# Patient Record
Sex: MALE | Race: WHITE | NOT HISPANIC OR LATINO | Employment: FULL TIME | URBAN - METROPOLITAN AREA
[De-identification: names, ages, dates, MRNs, and addresses within clinical notes are randomized per-mention and may not be internally consistent; named-entity substitution may affect disease eponyms.]

---

## 2021-06-02 ENCOUNTER — OFFICE VISIT CONVERTED (OUTPATIENT)
Dept: OTOLARYNGOLOGY | Facility: CLINIC | Age: 26
End: 2021-06-02
Attending: OTOLARYNGOLOGY

## 2021-06-02 ENCOUNTER — CONVERSION ENCOUNTER (OUTPATIENT)
Dept: OTOLARYNGOLOGY | Facility: CLINIC | Age: 26
End: 2021-06-02

## 2021-06-05 NOTE — H&P
"   History and Physical      Patient Name: Reji Argueta   Patient ID: [id number]   Sex: Male   YOB: 1995    Primary Care Provider: Rosemary YARBROUGH   Referring Provider: Rosmeary YARBROUGH    Visit Date: June 2, 2021    Provider: Hans Avalos MD   Location: [facility]   Location Address: [address] [place] [address], [address] [place] [address]  [id number]   Location Phone: [phone]          Chief Complaint     \"I am having trouble breathing through the left side of my nose.\"       History Of Present Illness  Reji Argueta is a 25 year old /White male with past medical history significant for nicotine use who presents to the office today as a consult from Rosemary YARBROUGH for evaluation of his nose. He that for approximately the last 5 years he has not been able to breathe through the left side of his nose. This is constant. He tells me he does experience right-sided rhinorrhea but none on the left. He has not had any issues with epistaxis, diminished sense of smell, or facial pain/pressure. He denies any prior nasal trauma or surgeries. He has tried mometasone and ipratropium bromide nasal spray without much improvement. His symptoms do not vary by the season. He does report a previous history of intranasal cocaine and methamphetamine use. He currently vapes. He has not undergone any imaging.       Past Medical History  Deviated nasal septum         Past Surgical History  Tonsillectomy         Medication List  Fish Oil 1,000 mg (120 mg-180 mg) oral capsule; Lexapro 20 mg oral tablet         Allergy List  NO KNOWN DRUG ALLERGIES         Family Medical History  Family history of heart disease; Family history of diabetes mellitus         Social History  Tobacco (Current some day); Vapes (Current every day)         Review of Systems  · Constitutional  o Denies  o : fever, night sweats, weight loss  · Eyes  o Denies  o : discharge from eye, impaired vision  · HENT  o Admits  o : " "*See HPI  · Cardiovascular  o Denies  o : chest pain, irregular heart beats  · Respiratory  o Denies  o : shortness of breath, wheezing, coughing up blood  · Gastrointestinal  o Denies  o : heartburn, reflux, vomiting blood  · Genitourinary  o Denies  o : frequency  · Integument  o Denies  o : rash, skin dryness  · Neurologic  o Denies  o : seizures, loss of balance, loss of consciousness, dizziness  · Endocrine  o Denies  o : cold intolerance, heat intolerance  · Heme-Lymph  o Denies  o : easy bleeding, anemia      Vitals  Date Time BP Position Site L\R Cuff Size HR RR TEMP (F) WT  HT  BMI kg/m2 BSA m2 O2 Sat FR L/min FiO2 HC       06/02/2021 10:28 AM        97.3 181lbs 6oz 6'  1.5\" 23.6 2.07             Physical Examination  · Constitutional  o Appearance  o : well developed, well-nourished, alert and in no acute distress, voice clear and strong  · Head and Face  o Head  o :   § Inspection  § : no deformities or lesions  o Face  o :   § Inspection  § : No facial lesions; House-Brackmann I/VI bilaterally  § Palpation  § : No TMJ crepitus nor  muscle tenderness bilaterally  · Eyes  o Vision  o :   § Visual Fields  § : Extraocular movements are intact. No spontaneous or gaze-induced nystagmus.  o Conjunctivae  o : clear  o Sclerae  o : clear  o Pupils and Irises  o : pupils equal, round, and reactive to light.   · Ears, Nose, Mouth and Throat  o Ears  o :   § External Ears  § : appearance within normal limits, no lesions present  § Otoscopic Examination  § : tympanic membrane appearance within normal limits bilaterally without perforations, well-aerated middle ears  § Hearing  § : intact to conversational voice both ears  o Nose  o :   § External Nose  § : appearance normal  § Intranasal Exam  § : mucosa within normal limits, vestibules normal, no intranasal lesions present, septum significantly deviated to the left causing approximately 99% obstruction, inferior turbinates are hypertrophied, sinuses non " tender to percussion  o Oral Cavity  o :   § Oral Mucosa  § : oral mucosa normal without pallor or cyanosis  § Lips  § : lip appearance normal  § Teeth  § : normal dentition for age  § Gums  § : gums pink, non-swollen, no bleeding present  § Tongue  § : tongue appearance normal; normal mobility  § Palate  § : hard palate normal, soft palate appearance normal with symmetric mobility  o Throat  o :   § Oropharynx  § : no inflammation or lesions present, tonsils surgically absent  § Hypopharynx  § : Deferred secondary to gag reflex  § Larynx  § : Deferred secondary to gag reflex  · Neck  o Inspection/Palpation  o : normal appearance, no masses or tenderness, trachea midline; thyroid size normal, nontender, no nodules or masses present on palpation  · Respiratory  o Respiratory Effort  o : breathing unlabored  o Inspection of Chest  o : normal appearance, no retractions  · Cardiovascular  o Heart  o : regular rate and rhythm  · Lymphatic  o Neck  o : no lymphadenopathy present  o Supraclavicular Nodes  o : no lymphadenopathy present  o Preauricular Nodes  o : no lymphadenopathy present  · Skin and Subcutaneous Tissue  o General Inspection  o : Regarding face and neck - there are no rashes present, no lesions present, and no areas of discoloration  · Neurologic  o Cranial Nerves  o : cranial nerves II-XII are grossly intact bilaterally  o Gait and Station  o : normal gait, able to stand without diffculty  · Psychiatric  o Judgement and Insight  o : judgment and insight intact  o Mood and Affect  o : mood normal, affect appropriate          Results     Diagnostic nasal endoscopy:    Indications: Left-sided nasal obstruction in a patient with inability to visualize the middle meatus on anterior rhinoscopy.    Summary: Patient's bilateral nares were decongested and anesthetized with Afrin and lidocaine sprays respectively. After giving the medications ample time to take effect a 0 rigid endoscope was inserted in the  bilateral nares revealing a left deviated nasal septum. The bilateral inferior turbinates were hypertrophied.  The bilateral middle turbinates were normal in appearance. The middle meati, olfactory clefts, and sphenoethmoidal recesses were without pus, polyps, or lesion bilaterally. The nasopharynx and bilateral eustachian tube orifices were without mass or lesion. The nasal mucosa was normal in appearance. The patient tolerated the procedure well.       Assessment  · Deviated nasal septum     470/J34.2  · Nasal obstruction     478.19/J34.89  · Hypertrophy of both inferior nasal turbinates     478.0/J34.3      Plan  · Orders  o Nasal endoscopy [facility] ([id number]) - 470/J34.2, 478.0/J34.3, 478.19/J34.89 - 06/02/2021  · Medications  o Medications have been Reconciled  o Transition of Care or Provider Policy  · Instructions  o Impressions and findings were discussed with Mr. Argueta at great length. Currently, he is seen for evaluation of left-sided nasal obstruction which is constant and has not improved significantly on mometasone and ipratropium bromide nasal sprays. Examination today reveals a significant left septal deviation and inferior turbinate hypertrophy. We discussed the pathophysiology and natural history of this condition. Options for management were discussed including continued medical management versus septoplasty with inferior turbinate reduction. The risks, benefits, and alternatives were discussed. The risks include septal perforation, epistaxis, infection, recurrence of congestion, nasal scarring, altered appearance, or CSF leak. After a thorough discussion he would like to pursue septoplasty with inferior turbinate reduction but would like to hold off for the time being secondary to his work schedule. He will call to arrange scheduling when he sees fit.  · Correspondence  o ENT Letter to Referring MD (Rosemary YARBROUGH) - 06/02/2021            Electronically Signed by: Hans Avalos MD -Author on  June 2, 2021 12:46:33 PM

## 2021-07-15 VITALS — TEMPERATURE: 97.3 F | HEIGHT: 73 IN | BODY MASS INDEX: 24.04 KG/M2 | WEIGHT: 181.37 LBS

## 2024-09-17 ENCOUNTER — TELEPHONE (OUTPATIENT)
Dept: GASTROENTEROLOGY | Facility: CLINIC | Age: 29
End: 2024-09-17
Payer: COMMERCIAL

## 2024-09-29 NOTE — PROGRESS NOTES
"Subjective   Reji Argueta is a 29 y.o. male who presents today for initial evaluation     Referring Provider:  Rosemary Holbrook PA-C  [address] [place] [address]    Chief Complaint: Anxiety    History of Present Illness:    09/30/2024: INITIAL VISIT Chart review:     Ottoniel: blank  Care Everywhere: a few non behavioral health notes    Psychotropic medication chart review:  Present:  Lexapro 20 mg a day  Trazodone 100 mg at night  Rexulti    Previously:      EKG: none  Procedures: none  Head imaging: none  Labs: none  Initial Chart Review Notes: Referred this month for depression.  Patient has a power of  in her father, Fercho.  Not much data.  Patient has missed or not responded to several calls made to set up follow-up.  These calls occurred from September 17 through September 20.      Patient Psychotherapy Notes:  Patient goals:  Misc:  \"Nervous laugh\"      Chart Review By Dates:  09/30/2024:   Planning:      VISITS/APPOINTMENTS (BELOW):    \"Reji\"      09/30/2024: In person.  Interview:  His/Her Story: \"Sometimes I have trouble taking care of myself.\"  Escitalopram is good to help depression.  Rexulti has been helping, it has been 3 weeks  Dreams are vivid though  Reluctant to go to sleep  Reminiscenses begin, like embarrassing memories  Rexulti helping with that.  P16, G10  Sleeping? improving  Eating? stable  Energy? Improving possibly  AIMS: no abnormal movements  Depression/Mood: improving  Depressed mood improving  poor energy improving  Insomnia improving.  Seasonal pattern: def  Severity: moderate, but improving  Duration: On and off for years  Anxiety:  Uncontrolled worrying, feeling on edge or restless, irritability,   insomnia.  Severity: mild  Duration: On and off for years  Panic attacks: stable  AIMS if on antipsychotic: no abnormal movements  Psych ROS: Positive for depression, anxiety.  Negative for psychosis   Diya is unclear.  ADHD: def  PTSD: def  No SI HI " ADINA.  Medication compliant: y    Access to Firearms: denies    PHQ-9 Depression Screening  PHQ-9 Total Score: 16    Little interest or pleasure in doing things? 3-->nearly every day   Feeling down, depressed, or hopeless? 1-->several days   Trouble falling or staying asleep, or sleeping too much? 2-->more than half the days   Feeling tired or having little energy? 3-->nearly every day   Poor appetite or overeating? 2-->more than half the days   Feeling bad about yourself - or that you are a failure or have let yourself or your family down? 2-->more than half the days   Trouble concentrating on things, such as reading the newspaper or watching television? 1-->several days   Moving or speaking so slowly that other people could have noticed? Or the opposite - being so fidgety or restless that you have been moving around a lot more than usual? 2-->more than half the days   Thoughts that you would be better off dead, or of hurting yourself in some way? 0-->not at all   PHQ-9 Total Score 16     EVE-7  Feeling nervous, anxious or on edge: Nearly every day  Not being able to stop or control worrying: Several days  Worrying too much about different things: Several days  Trouble Relaxing: Several days  Being so restless that it is hard to sit still: More than half the days  Feeling afraid as if something awful might happen: Several days  Becoming easily annoyed or irritable: Several days  EVE 7 Total Score: 10  If you checked any problems, how difficult have these problems made it for you to do your work, take care of things at home, or get along with other people: Very difficult    Past Surgical History:  Past Surgical History:   Procedure Laterality Date    TONSILLECTOMY         Problem List:  There is no problem list on file for this patient.      Allergy:   No Known Allergies     Discontinued Medications:  Medications Discontinued During This Encounter   Medication Reason    Brexpiprazole (Rexulti) 0.5 MG tablet      "Brexpiprazole (Rexulti) 2 MG tablet     Brexpiprazole (Rexulti) 1 MG tablet        Current Medications:   Current Outpatient Medications   Medication Sig Dispense Refill    Brexpiprazole (Rexulti) 1 MG tablet Take 1 tablet by mouth Daily. 30 tablet 5    Cholecalciferol (VITAMIN D-3 PO) Take  by mouth.      escitalopram (Lexapro) 20 MG tablet Lexapro 20 mg oral tablet take 1 tablet (20 mg) by oral route once daily   Active      ferrous sulfate 325 (65 Fe) MG tablet       hydrOXYzine (ATARAX) 25 MG tablet Every 6 (Six) Hours.      Omega-3 Fatty Acids (fish oil) 1000 MG capsule capsule Fish Oil 1,000 mg (120 mg-180 mg) oral capsule take 1 capsule by oral route daily   Active      traZODone (DESYREL) 100 MG tablet Take 1 tablet by mouth Every Night.       No current facility-administered medications for this visit.       Past Medical History:  Past Medical History:   Diagnosis Date    Anxiety     Depression        Past Psychiatric History:  Began Treatment: 12 - 13 years ago    Depression began at 11 yo  17 yo went to a Mand[facility] therapist for 3x    Seeing Rosemary since about 18 yo    Diagnoses: MDD, EVE  Psychiatrist:Denies  Therapist: yes in the past  Admission History:Denies  Medication Trials:    Celexa at 17 yo didn't work at all      Self Harm: Denies  Suicide Attempts:Denies      Substance Abuse History:   Types: 1 ppd, hx of cannabis, 20 - 25 yo was \"an alcoholic\", was on meth for 2 years, sober since 7/26/20  Withdrawal Symptoms:Denies  Longest Period Sober:Not Applicable   AA: none    Social History:  Martial Status:Single  Employed:No  Kids:No  House:Lives in a house   History: Denies    Social History     Socioeconomic History    Marital status: Single   Tobacco Use    Smoking status: Every Day     Current packs/day: 0.50     Average packs/day: 0.5 packs/day for 9.0 years (4.5 ttl pk-yrs)     Types: Cigarettes     Passive exposure: Current    Smokeless tobacco: Former   Vaping Use    Vaping status: " "Every Day    Substances: Nicotine   Substance and Sexual Activity    Alcohol use: Never    Drug use: Yes     Types: Marijuana     Comment: occ       Family History:   Suicide Attempts: Denies  Suicide Completions:Denies      History reviewed. No pertinent family history.    Developmental History:       Childhood: Denies Abuse, raised Sabianist  High School:Completed  College: some, one semester    Mental Status Exam  Appearance  : groomed, good eye contact, normal street clothes  Behavior  : pleasant and cooperative  Motor  : No abnormal  Speech  :normal rhythm, rate, volume, tone, not hyperverbal, not pressured, normal prosidy  Mood  : \"I'm doing pretty good now\"  Affect  : euthymic, mood congruent, good variability  Thought Content  : negative suicidal ideations, negative homicidal ideations, negative obsessions  Perceptions  : negative auditory hallucinations, negative visual hallucinations  Thought Process  : linear  Insight/Judgement  : Fair/fair  Cognition  : grossly intact  Attention   : intact      Review of Systems:  Review of Systems   Constitutional:  Positive for appetite change and fatigue.   Respiratory:  Positive for shortness of breath.    Psychiatric/Behavioral:  Positive for sleep disturbance.        Physical Exam:  Physical Exam    Vital Signs:   /70   Pulse 87   Ht 185.4 cm (73\")   Wt 98.4 kg (217 lb)   BMI 28.63 kg/m²      Lab Results:   No visits with results within 6 Month(s) from this visit.   Latest known visit with results is:   Admission on 08/20/2021, Discharged on 08/20/2021   Component Date Value Ref Range Status    SARS Antigen 08/20/2021 Not Detected  Not Detected Final    Internal Control 08/20/2021 Passed  Passed Final    Lot Number 08/20/2021 [id number]   Final    Expiration Date 08/20/2021 10,523   Final       EKG Results:  No orders to display       Imaging Results:  No Images in the past 120 days found..      Assessment & Plan   Diagnoses and all orders for this " visit:    1. Major depressive disorder, recurrent episode, moderate (Primary)  -     Brexpiprazole (Rexulti) 1 MG tablet; Take 1 tablet by mouth Daily.  Dispense: 30 tablet; Refill: 5    2. Generalized anxiety disorder    3. Insomnia due to mental condition  -     Brexpiprazole (Rexulti) 1 MG tablet; Take 1 tablet by mouth Daily.  Dispense: 30 tablet; Refill: 5    4. Panic attacks  -     Brexpiprazole (Rexulti) 1 MG tablet; Take 1 tablet by mouth Daily.  Dispense: 30 tablet; Refill: 5        Visit Diagnoses:    ICD-10-CM ICD-9-CM   1. Major depressive disorder, recurrent episode, moderate  F33.1 296.32   2. Generalized anxiety disorder  F41.1 300.02   3. Insomnia due to mental condition  F51.05 300.9     327.02   4. Panic attacks  F41.0 300.01     09/30/2024: R/O ADHD, bipolar. Improving MDD, EVE on rexulti. Has been on adderall before, but he avoids it due to substance use hx. 5w    PLAN:  Safety: No acute safety concerns  Therapy: None, maybe later  Risk Assessment: Risk of self-harm acutely is moderate.  Risk factors include anxiety disorder, mood disorder, AODA hx, and recent psychosocial stressors (pandemic). Protective factors include no family history, denies access to guns/weapons, no present SI, no history of suicide attempts or self-harm in the past, healthcare seeking, future orientation, willingness to engage in care.  Risk of self-harm chronically is also moderate, but could be further elevated in the event of treatment noncompliance and/or AODA.  Meds:  CONTINUE rexulti 1 mg qhs. Risks, benefits, alternatives discussed with patient including increased energy, exacerbation of irritability, weight gain, akathisia, GI upset, tardive dyskinesia/dystonia, movement issues, extrapyramidal symptoms, orthostatic hypotension.  Use care when operating vehicle, vessel, or machine. After discussion of these risks and benefits, the patient voiced understanding and agreed to proceed.  CONTINUE lexapro 20 mg qday.  Risks, benefits, alternatives discussed with patient including GI upset, nausea vomiting diarrhea, hyponatremia, theoretical decrease of seizure threshold predisposing the patient to a slightly higher seizure risk, headaches, sexual dysfunction, serotonin syndrome, bleeding risk, increased suicidality in patients 24 years and younger, switching to tenzin/hypomania.  After discussion of these risks and benefits, the patient voiced understanding and agreed to proceed.  CONTINUE hydroxyzine 25 mg qhs. Risks, benefits, alternatives discussed with patient including sedation, dizziness, fall risk, GI upset, and risk of increased CNS depression and elevated heart rate if taken with other antihistamines.  Use care when operating vehicle, vessel, or machine. After discussion of these risks and benefits, the patient voiced understanding and agreed to proceed.  Labs: none    Patient screened positive for depression based on a PHQ-9 score of 16 on 9/30/2024. Follow-up recommendations include: Prescribed antidepressant medication treatment and Suicide Risk Assessment performed.           TREATMENT PLAN/GOALS: Continue supportive psychotherapy efforts and medications as indicated. Treatment and medication options discussed during today's visit. Patient acknowledged and verbally consented to continue with current treatment plan and was educated on the importance of compliance with treatment and follow-up appointments.    MEDICATION ISSUES:  JASON reviewed as expected.  Discussed medication options and treatment plan of prescribed medication as well as the risks, benefits, and side effects including potential falls, possible impaired driving and metabolic adversities among others. Patient is agreeable to call the office with any worsening of symptoms or onset of side effects. Patient is agreeable to call 911 or go to the nearest ER should he/she begin having SI/HI. No medication side effects or related complaints today.     MEDS  ORDERED DURING VISIT:  New Medications Ordered This Visit   Medications    Brexpiprazole (Rexulti) 1 MG tablet     Sig: Take 1 tablet by mouth Daily.     Dispense:  30 tablet     Refill:  5       Return in about 5 weeks (around 11/4/2024).         This document has been electronically signed by Hiram Fitch MD  September 30, 2024 13:46 EDT    Dictated Utilizing Dragon Dictation: Part of this note may be an electronic transcription/translation of spoken language to printed text using the Dragon Dictation System.

## 2024-09-29 NOTE — PATIENT INSTRUCTIONS
1.  Please return to clinic at your next scheduled visit.  Contact the clinic ([phone]) at least 24 hours prior in the event you need to cancel.  2.  Do no harm to yourself or others.    3.  Avoid alcohol and drugs.    4.  Take all medications as prescribed.  Please contact the clinic with any concerns. If you are in need of medication refills, please call the clinic at [phone].    5. Should you want to get in touch with your provider, Dr. Hiram Fitch, please utilize [facility] or contact the office ([phone]), and staff will be able to page Dr. Fitch directly.  6.  In the event you have personal crisis, contact the following crisis numbers: Suicide Prevention Hotline 1-302.211.7713; [facility] Helpline 2-299-314-[facility]AE; [facility] Emergency Room [phone]; text HELLO to 637083; or 399.

## 2024-09-30 ENCOUNTER — OFFICE VISIT (OUTPATIENT)
Dept: PSYCHIATRY | Facility: CLINIC | Age: 29
End: 2024-09-30
Payer: COMMERCIAL

## 2024-09-30 VITALS
WEIGHT: 217 LBS | DIASTOLIC BLOOD PRESSURE: 70 MMHG | HEART RATE: 87 BPM | SYSTOLIC BLOOD PRESSURE: 136 MMHG | HEIGHT: 73 IN | BODY MASS INDEX: 28.76 KG/M2

## 2024-09-30 DIAGNOSIS — F41.1 GENERALIZED ANXIETY DISORDER: ICD-10-CM

## 2024-09-30 DIAGNOSIS — F51.05 INSOMNIA DUE TO MENTAL CONDITION: ICD-10-CM

## 2024-09-30 DIAGNOSIS — F33.1 MAJOR DEPRESSIVE DISORDER, RECURRENT EPISODE, MODERATE: Primary | ICD-10-CM

## 2024-09-30 DIAGNOSIS — F41.0 PANIC ATTACKS: ICD-10-CM

## 2024-09-30 PROCEDURE — 1160F RVW MEDS BY RX/DR IN RCRD: CPT | Performed by: STUDENT IN AN ORGANIZED HEALTH CARE EDUCATION/TRAINING PROGRAM

## 2024-09-30 PROCEDURE — 1159F MED LIST DOCD IN RCRD: CPT | Performed by: STUDENT IN AN ORGANIZED HEALTH CARE EDUCATION/TRAINING PROGRAM

## 2024-09-30 PROCEDURE — 90792 PSYCH DIAG EVAL W/MED SRVCS: CPT | Performed by: STUDENT IN AN ORGANIZED HEALTH CARE EDUCATION/TRAINING PROGRAM

## 2024-09-30 RX ORDER — BREXPIPRAZOLE 1 MG/1
1 TABLET ORAL EVERY 24 HOURS
Qty: 30 TABLET | Refills: 5 | Status: SHIPPED | OUTPATIENT
Start: 2024-09-30

## 2024-09-30 RX ORDER — BREXPIPRAZOLE 1 MG/1
TABLET ORAL EVERY 24 HOURS
COMMUNITY
Start: 2024-09-09 | End: 2024-09-30

## 2024-09-30 RX ORDER — HYDROXYZINE HYDROCHLORIDE 25 MG/1
TABLET, FILM COATED ORAL EVERY 6 HOURS SCHEDULED
COMMUNITY
Start: 2024-09-09

## 2024-09-30 RX ORDER — BREXPIPRAZOLE 2 MG/1
TABLET ORAL EVERY 24 HOURS
COMMUNITY
Start: 2024-09-09 | End: 2024-09-30

## 2024-09-30 RX ORDER — BREXPIPRAZOLE 0.5 MG/1
TABLET ORAL EVERY 24 HOURS
COMMUNITY
Start: 2024-09-09 | End: 2024-09-30

## 2024-09-30 RX ORDER — PNV NO.95/FERROUS FUM/FOLIC AC 28MG-0.8MG
TABLET ORAL
COMMUNITY
Start: 2024-09-18

## 2024-09-30 NOTE — TREATMENT PLAN
Multi-Disciplinary Problems (from Behavioral Health Treatment Plan)      Active Problems       Problem: Anxiety  Start Date: 09/30/24      Problem Details: The patient self-scales this problem as a 5 with 10 being the worst.  relationships, family conflict        Goal Priority Start Date Expected End Date End Date    Patient will develop and implement behavioral and cognitive strategies to reduce anxiety and irrational fears. -- 09/30/24 03/31/25 --    Goal Details: Progress toward goal:  Not appropriate to rate progress toward goal since this is the initial treatment plan.          Goal Intervention Frequency Start Date End Date    Help patient explore past emotional issues in relation to present anxiety. Q Month 09/30/24 --    Intervention Details: Duration of treatment until remission of symptoms.          Goal Intervention Frequency Start Date End Date    Help patient develop an awareness of their cognitive and physical responses to anxiety. Q Month 09/30/24 --    Intervention Details: Duration of treatment until remission of symptoms.                  Problem: Depression  Start Date: 09/30/24      Problem Details: The patient self-scales this problem as a 6 with 10 being the worst.  relationships, family conflict        Goal Priority Start Date Expected End Date End Date    Patient will demonstrate the ability to initiate new constructive life skills outside of sessions on a consistent basis. -- 09/30/24 03/31/25 --    Goal Details: Progress toward goal:  Not appropriate to rate progress toward goal since this is the initial treatment plan.          Goal Intervention Frequency Start Date End Date    Assist patient in setting attainable activities of daily living goals. PRN 09/30/24 --      Goal Intervention Frequency Start Date End Date    Provide education about depression Q Month 09/30/24 --    Intervention Details: Duration of treatment until remission of symptoms.          Goal Intervention Frequency Start  Date End Date    Assist patient in developing healthy coping strategies. Q Month 09/30/24 --    Intervention Details: Duration of treatment until remission of symptoms.                          Reviewed By       Hiram Fitch MD 09/30/24 [id number]                     I have discussed and reviewed this treatment plan with the patient.

## 2024-09-30 NOTE — PLAN OF CARE
[facility] psychotherapy to help manage depression and anxiety related to relationships, family conflict

## 2024-10-28 ENCOUNTER — OFFICE (OUTPATIENT)
Dept: URBAN - METROPOLITAN AREA CLINIC 76 | Facility: CLINIC | Age: 29
End: 2024-10-28
Payer: COMMERCIAL

## 2024-10-28 ENCOUNTER — OFFICE (OUTPATIENT)
Age: 29
End: 2024-10-28
Payer: COMMERCIAL

## 2024-10-28 VITALS
DIASTOLIC BLOOD PRESSURE: 68 MMHG | SYSTOLIC BLOOD PRESSURE: 110 MMHG | HEART RATE: 72 BPM | WEIGHT: 222 LBS | HEIGHT: 73 IN | DIASTOLIC BLOOD PRESSURE: 68 MMHG | OXYGEN SATURATION: 94 % | HEART RATE: 72 BPM | OXYGEN SATURATION: 94 % | WEIGHT: 222 LBS | HEIGHT: 73 IN | SYSTOLIC BLOOD PRESSURE: 110 MMHG

## 2024-10-28 DIAGNOSIS — R19.7 DIARRHEA, UNSPECIFIED: ICD-10-CM

## 2024-10-28 DIAGNOSIS — R13.10 DYSPHAGIA, UNSPECIFIED: ICD-10-CM

## 2024-10-28 DIAGNOSIS — R19.5 OTHER FECAL ABNORMALITIES: ICD-10-CM

## 2024-10-28 DIAGNOSIS — K21.9 GASTRO-ESOPHAGEAL REFLUX DISEASE WITHOUT ESOPHAGITIS: ICD-10-CM

## 2024-10-28 DIAGNOSIS — D50.9 IRON DEFICIENCY ANEMIA, UNSPECIFIED: ICD-10-CM

## 2024-10-28 DIAGNOSIS — Z83.719 FAMILY HISTORY OF COLON POLYPS, UNSPECIFIED: ICD-10-CM

## 2024-10-28 PROCEDURE — 99204 OFFICE O/P NEW MOD 45 MIN: CPT | Performed by: NURSE PRACTITIONER

## 2024-10-28 RX ORDER — OMEPRAZOLE 40 MG/1
CAPSULE, DELAYED RELEASE ORAL
Qty: 30 | Refills: 4 | Status: ACTIVE
Start: 2024-10-28

## 2024-10-28 RX ORDER — DICYCLOMINE HYDROCHLORIDE 10 MG/1
CAPSULE ORAL
Qty: 30 | Refills: 3 | Status: ACTIVE
Start: 2024-10-28

## 2024-11-08 ENCOUNTER — OFFICE VISIT (OUTPATIENT)
Dept: PSYCHIATRY | Facility: CLINIC | Age: 29
End: 2024-11-08
Payer: COMMERCIAL

## 2024-11-08 VITALS
HEIGHT: 73 IN | HEART RATE: 110 BPM | DIASTOLIC BLOOD PRESSURE: 66 MMHG | WEIGHT: 224.2 LBS | SYSTOLIC BLOOD PRESSURE: 130 MMHG | BODY MASS INDEX: 29.72 KG/M2

## 2024-11-08 DIAGNOSIS — F41.1 GENERALIZED ANXIETY DISORDER: ICD-10-CM

## 2024-11-08 DIAGNOSIS — F33.1 MAJOR DEPRESSIVE DISORDER, RECURRENT EPISODE, MODERATE: Primary | ICD-10-CM

## 2024-11-08 DIAGNOSIS — F41.0 PANIC ATTACKS: ICD-10-CM

## 2024-11-08 DIAGNOSIS — F51.05 INSOMNIA DUE TO MENTAL CONDITION: ICD-10-CM

## 2024-11-08 RX ORDER — BREXPIPRAZOLE 1 MG/1
1.5 TABLET ORAL EVERY 24 HOURS
Qty: 45 TABLET | Refills: 5 | Status: SHIPPED | OUTPATIENT
Start: 2024-11-08

## 2024-11-08 RX ORDER — DICYCLOMINE HYDROCHLORIDE 10 MG/1
CAPSULE ORAL
COMMUNITY
Start: 2024-10-28

## 2024-11-08 RX ORDER — OMEPRAZOLE 40 MG/1
CAPSULE, DELAYED RELEASE ORAL
COMMUNITY
Start: 2024-10-28

## 2024-11-08 RX ORDER — ROPINIROLE 0.5 MG/1
TABLET, FILM COATED ORAL EVERY 8 HOURS SCHEDULED
COMMUNITY

## 2024-11-08 NOTE — PROGRESS NOTES
"Subjective   Reji Argueta is a 29 y.o. male who presents today for initial evaluation     Referring Provider:  No referring provider defined for this encounter.    Chief Complaint: Anxiety    History of Present Illness:    2024: INITIAL VISIT Chart review:     Ottoniel: blank  Care Everywhere: a few non behavioral health notes    Psychotropic medication chart review:  Present:  Lexapro 20 mg a day  Trazodone 100 mg at night  Rexulti    Previously:  2024: R/O ADHD, bipolar. Improving MDD, EVE on rexulti. Has been on adderall before, but he avoids it due to substance use hx. 5w    EKG: none  Procedures: none  Head imaging: none  Labs: none  Initial Chart Review Notes: Referred this month for depression.  Patient has a power of  in her father, Fercho.  Not much data.  Patient has missed or not responded to several calls made to set up follow-up.  These calls occurred from  through .      Chart Review By Dates:  24: no visits.    Plannin2024: R/O ADHD, bipolar. Improving MDD, EVE on rexulti. Has been on adderall before, but he avoids it due to substance use hx. 5w    VISITS/APPOINTMENTS (BELOW):    \"Reji\"  \"Nervous laugh\"  Has been on adderall before, but avoids 2/2 substance use hx  R/o bipolar    2024:   In person interview:  \"Good.\"  Plateaued. People around me say I don't have mood swings.  I still have bouts of depression  Plans on going back to work soon  Anxiety and depression both improved  MDD: mild depressed mood  EVE: agoraphobia  Panic attacks: yes  Energy: improving  Concentration: chronically low  Insomnia: stable  AIMS if on antipsychotic:   Eating/Weight: 224 lbs  Refills: y  Substances: 1 ppd, drinks 1x/mo, no cannabis but using OTC delta9 vape  Therapy: n  Medication compliant: y  SE: n  No SI HI AVH.      2024:   In person Interview:  His/Her Story: \"Sometimes I have trouble taking care of myself.\"  Escitalopram is good to " help depression.  Rexulti has been helping, it has been 3 weeks  Dreams are vivid though  Reluctant to go to sleep  Reminiscenses begin, like embarrassing memories  Rexulti helping with that.  P16, G10  Sleeping? improving  Eating? stable  Energy? Improving possibly  AIMS: no abnormal movements  Depression/Mood: improving  Depressed mood improving  poor energy improving  Insomnia improving.  Seasonal pattern: def  Severity: moderate, but improving  Duration: On and off for years  Anxiety:  Uncontrolled worrying, feeling on edge or restless, irritability,   insomnia.  Severity: mild  Duration: On and off for years  Panic attacks: stable  AIMS if on antipsychotic: no abnormal movements  Psych ROS: Positive for depression, anxiety.  Negative for psychosis   Diya is unclear.  ADHD: def  PTSD: def  No SI HI AVH.  Medication compliant: y    Access to Firearms: denies    PHQ-9 Depression Screening  PHQ-9 Total Score:      Little interest or pleasure in doing things?     Feeling down, depressed, or hopeless?     Trouble falling or staying asleep, or sleeping too much?     Feeling tired or having little energy?     Poor appetite or overeating?     Feeling bad about yourself - or that you are a failure or have let yourself or your family down?     Trouble concentrating on things, such as reading the newspaper or watching television?     Moving or speaking so slowly that other people could have noticed? Or the opposite - being so fidgety or restless that you have been moving around a lot more than usual?     Thoughts that you would be better off dead, or of hurting yourself in some way?     PHQ-9 Total Score       EVE-7       Past Surgical History:  Past Surgical History:   Procedure Laterality Date    TONSILLECTOMY         Problem List:  There is no problem list on file for this patient.      Allergy:   No Known Allergies     Discontinued Medications:  Medications Discontinued During This Encounter   Medication Reason     "Brexpiprazole (Rexulti) 1 MG tablet Reorder         Current Medications:   Current Outpatient Medications   Medication Sig Dispense Refill    Brexpiprazole (Rexulti) 1 MG tablet Take 1.5 tablets by mouth Daily. 45 tablet 5    Cholecalciferol (VITAMIN D-3 PO) Take  by mouth.      dicyclomine (BENTYL) 10 MG capsule       escitalopram (Lexapro) 20 MG tablet Lexapro 20 mg oral tablet take 1 tablet (20 mg) by oral route once daily   Active      ferrous sulfate 325 (65 Fe) MG tablet       hydrOXYzine (ATARAX) 25 MG tablet Every 6 (Six) Hours.      Omega-3 Fatty Acids (fish oil) 1000 MG capsule capsule Fish Oil 1,000 mg (120 mg-180 mg) oral capsule take 1 capsule by oral route daily   Active      omeprazole (priLOSEC) 40 MG capsule       rOPINIRole (REQUIP) 0.5 MG tablet Every 8 (Eight) Hours.      traZODone (DESYREL) 100 MG tablet Take 1 tablet by mouth Every Night.       No current facility-administered medications for this visit.       Past Medical History:  Past Medical History:   Diagnosis Date    Anxiety     Depression        Past Psychiatric History:  Began Treatment: 12 - 13 years ago    Depression began at 11 yo  15 yo went to a [employer] therapist for 3x    Seeing [facility] since about 18 yo    Diagnoses: MDD, EVE  Psychiatrist:Denies  Therapist: yes in the past  Admission History:Denies  Medication Trials:    Celexa at 15 yo didn't work at all      Self Harm: Denies  Suicide Attempts:Denies      Substance Abuse History:   Types: 1 ppd, hx of cannabis, 20 - 23 yo was \"an alcoholic\", was on meth for 2 years, sober since 7/26/20  Withdrawal Symptoms:Denies  Longest Period Sober:Not Applicable   AA: none    Social History:  Martial Status:Single  Employed:No  Kids:No  House:Lives in a house   History: Denies    Social History     Socioeconomic History    Marital status: Single   Tobacco Use    Smoking status: Every Day     Current packs/day: 0.50     Average packs/day: 0.5 packs/day for 9.0 years (4.5 ttl pk-yrs) " "    Types: Cigarettes     Passive exposure: Current    Smokeless tobacco: Former   Vaping Use    Vaping status: Every Day    Substances: Nicotine   Substance and Sexual Activity    Alcohol use: Never    Drug use: Yes     Types: Marijuana     Comment: occ       Family History:   Suicide Attempts: Denies  Suicide Completions:Denies      History reviewed. No pertinent family history.    Developmental History:       Childhood: Denies Abuse, raised [employer]  High School:Completed  College: some, one semester    Mental Status Exam  Appearance  : groomed, good eye contact, normal street clothes  Behavior  : pleasant and cooperative  Motor  : No abnormal  Speech  :normal rhythm, rate, volume, tone, not hyperverbal, not pressured, normal prosidy  Mood  : \"Plateued\"  Affect  : euthymic, mood congruent, good variability  Thought Content  : negative suicidal ideations, negative homicidal ideations, negative obsessions  Perceptions  : negative auditory hallucinations, negative visual hallucinations  Thought Process  : linear  Insight/Judgement  : Fair/fair  Cognition  : grossly intact  Attention   : intact      Review of Systems:  Review of Systems   Constitutional:  Negative for appetite change, diaphoresis and fatigue.   HENT:  Negative for drooling.    Eyes:  Negative for visual disturbance.   Respiratory:  Negative for cough, chest tightness and shortness of breath.    Cardiovascular:  Negative for chest pain, palpitations and leg swelling.   Gastrointestinal:  Negative for abdominal pain, diarrhea, nausea and vomiting.   Endocrine: Negative for cold intolerance and heat intolerance.   Genitourinary:  Negative for difficulty urinating.   Musculoskeletal:  Negative for joint swelling.   Allergic/Immunologic: Negative for immunocompromised state.   Neurological:  Negative for dizziness, seizures, speech difficulty, light-headedness, numbness and headaches.   Psychiatric/Behavioral:  Negative for behavioral problems and sleep " "disturbance.        Physical Exam:  Physical Exam    Vital Signs:   /66   Pulse 110   Ht 185.4 cm (73\")   Wt 102 kg (224 lb 3.2 oz)   BMI 29.58 kg/m²      Lab Results:   No visits with results within 6 Month(s) from this visit.   Latest known visit with results is:   Admission on 08/20/2021, Discharged on 08/20/2021   Component Date Value Ref Range Status    SARS Antigen 08/20/2021 Not Detected  Not Detected Final    Internal Control 08/20/2021 Passed  Passed Final    Lot Number 08/20/2021 [id number]   Final    Expiration Date 08/20/2021 10,523   Final       EKG Results:  No orders to display       Imaging Results:  No Images in the past 120 days found..      Assessment & Plan   Diagnoses and all orders for this visit:    1. Major depressive disorder, recurrent episode, moderate (Primary)  -     Brexpiprazole (Rexulti) 1 MG tablet; Take 1.5 tablets by mouth Daily.  Dispense: 45 tablet; Refill: 5    2. Generalized anxiety disorder    3. Insomnia due to mental condition  -     Brexpiprazole (Rexulti) 1 MG tablet; Take 1.5 tablets by mouth Daily.  Dispense: 45 tablet; Refill: 5    4. Panic attacks  -     Brexpiprazole (Rexulti) 1 MG tablet; Take 1.5 tablets by mouth Daily.  Dispense: 45 tablet; Refill: 5        Visit Diagnoses:    ICD-10-CM ICD-9-CM   1. Major depressive disorder, recurrent episode, moderate  F33.1 296.32   2. Generalized anxiety disorder  F41.1 300.02   3. Insomnia due to mental condition  F51.05 300.9     327.02   4. Panic attacks  F41.0 300.01     11/08/2024: Improving, increase rexulti for MDD, EVE.    Allowed patient to freely discuss and process issues, such as:  Anxiety at re-starting work.  Anxiety and depression regarding relationships.  ... using Rogerian psychotherapeutic techniques including unconditional positive regard, reflective listening, and demonstrating clear empathy, with the goal of ameliorating symptoms and maintaining, restoring, or improving self-esteem, adaptive skills, " and ego or psychological functions (Fox et al. 1991), the long-term goal of which is to develop a better, healthier perspective and help the patient bear their circumstances more easily.  Time (minutes) spent providing supportive psychotherapy: 16  (This time is exclusive to the therapy session and separate from the time spent on activities used to meet the criteria for the E/M service (history, exam, medical decision-making).)  Start: 1:03  Stop: 1:19  Functional status: mild impairment  Treatment plan: Medication management and supportive psychotherapy  Prognosis: good  Progress: improving  6w    09/30/2024: R/O ADHD, bipolar. Improving MDD, EVE on rexulti. Has been on adderall before, but he avoids it due to substance use hx. 5w    PLAN:  Safety: No acute safety concerns  Therapy: None, maybe later  Risk Assessment: Risk of self-harm acutely is moderate.  Risk factors include anxiety disorder, mood disorder, AODA hx, and recent psychosocial stressors (pandemic). Protective factors include no family history, denies access to guns/weapons, no present SI, no history of suicide attempts or self-harm in the past, healthcare seeking, future orientation, willingness to engage in care.  Risk of self-harm chronically is also moderate, but could be further elevated in the event of treatment noncompliance and/or AODA.  Meds:  INCREASE rexulti 1 to 1.5 mg qhs. Risks, benefits, alternatives discussed with patient including increased energy, exacerbation of irritability, weight gain, akathisia, GI upset, tardive dyskinesia/dystonia, movement issues, extrapyramidal symptoms, orthostatic hypotension.  Use care when operating vehicle, vessel, or machine. After discussion of these risks and benefits, the patient voiced understanding and agreed to proceed.  CONTINUE lexapro 20 mg qday. Risks, benefits, alternatives discussed with patient including GI upset, nausea vomiting diarrhea, hyponatremia, theoretical decrease of seizure  threshold predisposing the patient to a slightly higher seizure risk, headaches, sexual dysfunction, serotonin syndrome, bleeding risk, increased suicidality in patients 24 years and younger, switching to tenzin/hypomania.  After discussion of these risks and benefits, the patient voiced understanding and agreed to proceed.  CONTINUE hydroxyzine 25 mg qhs. Risks, benefits, alternatives discussed with patient including sedation, dizziness, fall risk, GI upset, and risk of increased CNS depression and elevated heart rate if taken with other antihistamines.  Use care when operating vehicle, vessel, or machine. After discussion of these risks and benefits, the patient voiced understanding and agreed to proceed.  Labs: none    Patient screened positive for depression based on a PHQ-9 score of  on . Follow-up recommendations include: Prescribed antidepressant medication treatment and Suicide Risk Assessment performed.           TREATMENT PLAN/GOALS: Continue supportive psychotherapy efforts and medications as indicated. Treatment and medication options discussed during today's visit. Patient acknowledged and verbally consented to continue with current treatment plan and was educated on the importance of compliance with treatment and follow-up appointments.    MEDICATION ISSUES:  JASON reviewed as expected.  Discussed medication options and treatment plan of prescribed medication as well as the risks, benefits, and side effects including potential falls, possible impaired driving and metabolic adversities among others. Patient is agreeable to call the office with any worsening of symptoms or onset of side effects. Patient is agreeable to call 911 or go to the nearest ER should he/she begin having SI/HI. No medication side effects or related complaints today.     MEDS ORDERED DURING VISIT:  New Medications Ordered This Visit   Medications    Brexpiprazole (Rexulti) 1 MG tablet     Sig: Take 1.5 tablets by mouth Daily.      Dispense:  45 tablet     Refill:  5     Replaces 1 mg dose. Thank you for the help. Please call with questions: [phone].       Return in about 6 weeks (around 12/20/2024).         This document has been electronically signed by Hiram Fitch MD  November 8, 2024 13:20 EST    Dictated Utilizing Dragon Dictation: Part of this note may be an electronic transcription/translation of spoken language to printed text using the Dragon Dictation System.

## 2024-11-08 NOTE — PATIENT INSTRUCTIONS
1.  Please return to clinic at your next scheduled visit.  Contact the clinic ([phone]) at least 24 hours prior in the event you need to cancel.  2.  Do no harm to yourself or others.    3.  Avoid alcohol and drugs.    4.  Take all medications as prescribed.  Please contact the clinic with any concerns. If you are in need of medication refills, please call the clinic at [phone].    5. Should you want to get in touch with your provider, Dr. Hiram Fitch, please utilize [employer] or contact the office ([phone]), and staff will be able to page Dr. Fitch directly.  6.  In the event you have personal crisis, contact the following crisis numbers: Suicide Prevention Hotline 1-142.472.1176; [facility] Helpline 4-421-357-[facility]; [facility] Emergency Room [phone]; text HELLO to 959455; or 182.

## 2024-12-09 ENCOUNTER — TELEPHONE (OUTPATIENT)
Dept: PSYCHIATRY | Facility: CLINIC | Age: 29
End: 2024-12-09
Payer: COMMERCIAL

## 2024-12-30 ENCOUNTER — OFFICE VISIT (OUTPATIENT)
Dept: PSYCHIATRY | Facility: CLINIC | Age: 29
End: 2024-12-30
Payer: COMMERCIAL

## 2024-12-30 VITALS
HEART RATE: 71 BPM | BODY MASS INDEX: 30.35 KG/M2 | HEIGHT: 73 IN | OXYGEN SATURATION: 97 % | WEIGHT: 229 LBS | SYSTOLIC BLOOD PRESSURE: 115 MMHG | DIASTOLIC BLOOD PRESSURE: 64 MMHG

## 2024-12-30 DIAGNOSIS — F41.1 GENERALIZED ANXIETY DISORDER: ICD-10-CM

## 2024-12-30 DIAGNOSIS — F51.05 INSOMNIA DUE TO MENTAL CONDITION: ICD-10-CM

## 2024-12-30 DIAGNOSIS — F33.1 MAJOR DEPRESSIVE DISORDER, RECURRENT EPISODE, MODERATE: Primary | ICD-10-CM

## 2024-12-30 DIAGNOSIS — F41.0 PANIC ATTACKS: ICD-10-CM

## 2024-12-30 RX ORDER — ATORVASTATIN CALCIUM 10 MG/1
1 TABLET, FILM COATED ORAL DAILY
COMMUNITY
Start: 2024-12-23 | End: 2025-01-22

## 2024-12-30 RX ORDER — BREXPIPRAZOLE 1 MG/1
1.5 TABLET ORAL EVERY 24 HOURS
Qty: 45 TABLET | Refills: 5 | Status: SHIPPED | OUTPATIENT
Start: 2024-12-30

## 2024-12-30 RX ORDER — ESCITALOPRAM OXALATE 20 MG/1
20 TABLET ORAL DAILY
Qty: 90 TABLET | Refills: 3 | Status: SHIPPED | OUTPATIENT
Start: 2024-12-30

## 2024-12-30 NOTE — TREATMENT PLAN
Multi-Disciplinary Problems (from Behavioral Health Treatment Plan)      Active Problems       Problem: Anxiety  Start Date: 12/30/24      Problem Details: The patient self-scales this problem as a 4 with 10 being the worst.  relationships, family conflict, work stresses        Goal Priority Start Date Expected End Date End Date    Patient will develop and implement behavioral and cognitive strategies to reduce anxiety and irrational fears. -- 12/30/24 06/30/25 --    Goal Details: Progress toward goal:  improving          Goal Intervention Frequency Start Date End Date    Help patient explore past emotional issues in relation to present anxiety. Q Month 12/30/24 --    Intervention Details: Duration of treatment until remission of symptoms.          Goal Intervention Frequency Start Date End Date    Help patient develop an awareness of their cognitive and physical responses to anxiety. Q Month 12/30/24 --    Intervention Details: Duration of treatment until remission of symptoms.                  Problem: Depression  Start Date: 12/30/24      Problem Details: The patient self-scales this problem as a 4 with 10 being the worst.  relationships, family conflict, work stresses        Goal Priority Start Date Expected End Date End Date    Patient will demonstrate the ability to initiate new constructive life skills outside of sessions on a consistent basis. -- 12/30/24 06/30/25 --    Goal Details: Progress toward goal:  improving          Goal Intervention Frequency Start Date End Date    Assist patient in setting attainable activities of daily living goals. PRN 12/30/24 --      Goal Intervention Frequency Start Date End Date    Provide education about depression Q Month 12/30/24 --    Intervention Details: Duration of treatment until remission of symptoms.          Goal Intervention Frequency Start Date End Date    Assist patient in developing healthy coping strategies. Q Month 12/30/24 --    Intervention Details:  Duration of treatment until remission of symptoms.                          Reviewed By       Hiram Fitch MD 12/30/24 1859                     I have discussed and reviewed this treatment plan with the patient.

## 2024-12-30 NOTE — PROGRESS NOTES
"Subjective   Reji Argueta is a 29 y.o. male who presents today for initial evaluation     Referring Provider:  No referring provider defined for this encounter.    Chief Complaint: Anxiety    History of Present Illness:    2024: INITIAL VISIT Chart review:     Ottoniel: blank  Care Everywhere: a few non behavioral health notes    Psychotropic medication chart review:  Present:  Lexapro 20 mg a day  Trazodone 100 mg at night  Rexulti    Previously:  2024: R/O ADHD, bipolar. Improving MDD, EVE on rexulti. Has been on adderall before, but he avoids it due to substance use hx. 5w    EKG: none  Procedures: none  Head imaging: none  Labs: none  Initial Chart Review Notes: Referred this month for depression.  Patient has a power of  in her father, Fercho.  Not much data.  Patient has missed or not responded to several calls made to set up follow-up.  These calls occurred from  through .      Chart Review By Dates:  2024: no visits.  24: no visits.    Plannin2024: Improving, increase rexulti for MDD, EVE.  2024: R/O ADHD, bipolar. Improving MDD, EVE on rexulti. Has been on adderall before, but he avoids it due to substance use hx. 5w    VISITS/APPOINTMENTS (BELOW):    \"Reji\"  \"Nervous laugh\"  Has been on adderall before, but avoids 2/2 substance use hx  R/o bipolar    2024:   In person interview:  \"I've been ok, but I haven't been taking the medication.\"  Dad just had brain surgery, now in rehab. Had brain cancer.  I'm back to where I was.  Ruminating  Mom noticed I'm not on it. More negative, maybe more irritable  Bouts of depression are worse  Not really using any substances  MDD: mild depressed mood  EVE: agoraphobia, irritable  Panic attacks: yes  Energy: improving  Concentration: chronically low  Insomnia: stable  AIMS if on antipsychotic: no abnl movements  Eating/Weight: 229, 224 lbs  Refills: y  Substances: 1 ppd, drinks 1x/mo, no " "cannabis but using OTC delta9 vape  Therapy: n  Medication compliant: y  SE: n  No SI HI AVH.      11/08/2024:   In person interview:  \"Good.\"  Plateaued. People around me say I don't have mood swings.  I still have bouts of depression  Plans on going back to work soon  Anxiety and depression both improved  MDD: mild depressed mood  EVE: agoraphobia  Panic attacks: yes  Energy: improving  Concentration: chronically low  Insomnia: stable  AIMS if on antipsychotic:   Eating/Weight: 224 lbs  Refills: y  Substances: 1 ppd, drinks 1x/mo, no cannabis but using OTC delta9 vape  Therapy: n  Medication compliant: y  SE: n  No SI HI AVH.      09/30/2024:   In person Interview:  His/Her Story: \"Sometimes I have trouble taking care of myself.\"  Escitalopram is good to help depression.  Rexulti has been helping, it has been 3 weeks  Dreams are vivid though  Reluctant to go to sleep  Reminiscenses begin, like embarrassing memories  Rexulti helping with that.  P16, G10  Sleeping? improving  Eating? stable  Energy? Improving possibly  AIMS: no abnormal movements  Depression/Mood: improving  Depressed mood improving  poor energy improving  Insomnia improving.  Seasonal pattern: def  Severity: moderate, but improving  Duration: On and off for years  Anxiety:  Uncontrolled worrying, feeling on edge or restless, irritability,   insomnia.  Severity: mild  Duration: On and off for years  Panic attacks: stable  AIMS if on antipsychotic: no abnormal movements  Psych ROS: Positive for depression, anxiety.  Negative for psychosis   Diya is unclear.  ADHD: def  PTSD: def  No SI HI AVH.  Medication compliant: y    Access to Firearms: denies    PHQ-9 Depression Screening  PHQ-9 Total Score:      Little interest or pleasure in doing things?     Feeling down, depressed, or hopeless?     Trouble falling or staying asleep, or sleeping too much?     Feeling tired or having little energy?     Poor appetite or overeating?     Feeling bad about " yourself - or that you are a failure or have let yourself or your family down?     Trouble concentrating on things, such as reading the newspaper or watching television?     Moving or speaking so slowly that other people could have noticed? Or the opposite - being so fidgety or restless that you have been moving around a lot more than usual?     Thoughts that you would be better off dead, or of hurting yourself in some way?     PHQ-9 Total Score       EVE-7       Past Surgical History:  Past Surgical History:   Procedure Laterality Date    TONSILLECTOMY         Problem List:  There is no problem list on file for this patient.      Allergy:   No Known Allergies     Discontinued Medications:  Medications Discontinued During This Encounter   Medication Reason    Brexpiprazole (Rexulti) 1 MG tablet Reorder    traZODone (DESYREL) 100 MG tablet *Therapy completed    hydrOXYzine (ATARAX) 25 MG tablet *Therapy completed    escitalopram (Lexapro) 20 MG tablet Reorder           Current Medications:   Current Outpatient Medications   Medication Sig Dispense Refill    atorvastatin (LIPITOR) 10 MG tablet Take 1 tablet by mouth Daily.      Brexpiprazole (Rexulti) 1 MG tablet Take 1.5 tablets by mouth Daily. 45 tablet 5    Cholecalciferol (VITAMIN D-3 PO) Take  by mouth.      escitalopram (Lexapro) 20 MG tablet Take 1 tablet by mouth Daily. 90 tablet 3    ferrous sulfate 325 (65 Fe) MG tablet       Omega-3 Fatty Acids (fish oil) 1000 MG capsule capsule Fish Oil 1,000 mg (120 mg-180 mg) oral capsule take 1 capsule by oral route daily   Active      omeprazole (priLOSEC) 40 MG capsule       rOPINIRole (REQUIP) 0.5 MG tablet Every 8 (Eight) Hours.      vitamin D3 125 MCG (5000 UT) capsule capsule 1 capsule Daily.      dicyclomine (BENTYL) 10 MG capsule  (Patient not taking: Reported on 12/30/2024)       No current facility-administered medications for this visit.       Past Medical History:  Past Medical History:   Diagnosis Date     "Anxiety     Depression        Past Psychiatric History:  Began Treatment: 12 - 13 years ago    Depression began at 13 yo  17 yo went to a [employer] therapist for 3x    Seeing [facility] since about 16 yo    Diagnoses: MDD, EVE  Psychiatrist:Denies  Therapist: yes in the past  Admission History:Denies  Medication Trials:    Celexa at 17 yo didn't work at all      Self Harm: Denies  Suicide Attempts:Denies      Substance Abuse History:   Types: 1 ppd, hx of cannabis, 20 - 25 yo was \"an alcoholic\", was on meth for 2 years, sober since 7/26/20  Withdrawal Symptoms:Denies  Longest Period Sober:Not Applicable   AA: none    Social History:  Martial Status:Single  Employed:No  Kids:No  House:Lives in a house   History: Denies    Social History     Socioeconomic History    Marital status: Single   Tobacco Use    Smoking status: Every Day     Current packs/day: 0.50     Average packs/day: 0.5 packs/day for 9.0 years (4.5 ttl pk-yrs)     Types: Cigarettes     Passive exposure: Current    Smokeless tobacco: Former   Vaping Use    Vaping status: Every Day    Substances: Nicotine   Substance and Sexual Activity    Alcohol use: Never    Drug use: Yes     Types: Marijuana     Comment: occ       Family History:   Suicide Attempts: Denies  Suicide Completions:Denies      History reviewed. No pertinent family history.    Developmental History:       Childhood: Denies Abuse, raised Zoroastrian  High School:Completed  College: some, one semester    Mental Status Exam  Appearance  : groomed, good eye contact, normal street clothes  Behavior  : pleasant and cooperative  Motor  : No abnormal  Speech  :normal rhythm, rate, volume, tone, not hyperverbal, not pressured, normal prosidy  Mood  : \"I stopped taking the rexulti\"  Affect  : euthymic, mood congruent, good variability  Thought Content  : negative suicidal ideations, negative homicidal ideations, negative obsessions  Perceptions  : negative auditory hallucinations, negative visual " "hallucinations  Thought Process  : linear  Insight/Judgement  : Fair/fair  Cognition  : grossly intact  Attention   : intact      Review of Systems:  Review of Systems   Constitutional:  Negative for appetite change, diaphoresis and fatigue.   HENT:  Negative for drooling.    Eyes:  Negative for visual disturbance.   Respiratory:  Negative for cough, chest tightness and shortness of breath.    Cardiovascular:  Negative for chest pain, palpitations and leg swelling.   Gastrointestinal:  Negative for abdominal pain, diarrhea, nausea and vomiting.   Endocrine: Negative for cold intolerance and heat intolerance.   Genitourinary:  Negative for difficulty urinating.   Musculoskeletal:  Negative for joint swelling.   Allergic/Immunologic: Negative for immunocompromised state.   Neurological:  Negative for dizziness, seizures, speech difficulty, light-headedness, numbness and headaches.   Psychiatric/Behavioral:  Negative for behavioral problems and sleep disturbance.        Physical Exam:  Physical Exam    Vital Signs:   /64   Pulse 71   Ht 185.4 cm (73\")   Wt 104 kg (229 lb)   SpO2 97%   BMI 30.21 kg/m²      Lab Results:   No visits with results within 6 Month(s) from this visit.   Latest known visit with results is:   Admission on 08/20/2021, Discharged on 08/20/2021   Component Date Value Ref Range Status    SARS Antigen 08/20/2021 Not Detected  Not Detected Final    Internal Control 08/20/2021 Passed  Passed Final    Lot Number 08/20/2021 [id number]   Final    Expiration Date 08/20/2021 10,523   Final       EKG Results:  No orders to display       Imaging Results:  No Images in the past 120 days found..      Assessment & Plan   Diagnoses and all orders for this visit:    1. Major depressive disorder, recurrent episode, moderate (Primary)  -     Brexpiprazole (Rexulti) 1 MG tablet; Take 1.5 tablets by mouth Daily.  Dispense: 45 tablet; Refill: 5  -     escitalopram (Lexapro) 20 MG tablet; Take 1 tablet by mouth " Daily.  Dispense: 90 tablet; Refill: 3    2. Generalized anxiety disorder  -     escitalopram (Lexapro) 20 MG tablet; Take 1 tablet by mouth Daily.  Dispense: 90 tablet; Refill: 3    3. Insomnia due to mental condition  -     Brexpiprazole (Rexulti) 1 MG tablet; Take 1.5 tablets by mouth Daily.  Dispense: 45 tablet; Refill: 5  -     escitalopram (Lexapro) 20 MG tablet; Take 1 tablet by mouth Daily.  Dispense: 90 tablet; Refill: 3    4. Panic attacks  -     Brexpiprazole (Rexulti) 1 MG tablet; Take 1.5 tablets by mouth Daily.  Dispense: 45 tablet; Refill: 5  -     escitalopram (Lexapro) 20 MG tablet; Take 1 tablet by mouth Daily.  Dispense: 90 tablet; Refill: 3        Visit Diagnoses:    ICD-10-CM ICD-9-CM   1. Major depressive disorder, recurrent episode, moderate  F33.1 296.32   2. Generalized anxiety disorder  F41.1 300.02   3. Insomnia due to mental condition  F51.05 300.9     327.02   4. Panic attacks  F41.0 300.01     12/30/2024: MDD, EVE, restart rexulti (re-sent in).    Acknowledged and normalized patient's thoughts, feelings, and concerns. Allowed patient to freely discuss and process issues, such as:  Anxiety at work.  Anxiety and depression regarding relationships.  ... using Rogerian psychotherapeutic techniques including unconditional positive regard, reflective listening, and demonstrating clear empathy, with the goal of ameliorating symptoms and maintaining, restoring, or improving self-esteem, adaptive skills, and ego or psychological functions (Fox et al. 1991), the long-term goal of which is to develop a better, healthier perspective and help the patient bear their circumstances more easily.  Time (minutes) spent providing supportive psychotherapy: 16  (This time is exclusive to the therapy session and separate from the time spent on activities used to meet the criteria for the E/M service (history, exam, medical decision-making).)  Start: 11:40  Stop: 11:56  Functional status: mild  impairment  Treatment plan: Medication management and supportive psychotherapy  Prognosis: good  Progress: MDD, EVE  6w    11/08/2024: Improving, increase rexulti for MDD, EVE.    09/30/2024: R/O ADHD, bipolar. Improving MDD, EVE on rexulti. Has been on adderall before, but he avoids it due to substance use hx. 5w    PLAN:  Safety: No acute safety concerns  Therapy: None, maybe later  Risk Assessment: Risk of self-harm acutely is moderate.  Risk factors include anxiety disorder, mood disorder, AODA hx, and recent psychosocial stressors (pandemic). Protective factors include no family history, denies access to guns/weapons, no present SI, no history of suicide attempts or self-harm in the past, healthcare seeking, future orientation, willingness to engage in care.  Risk of self-harm chronically is also moderate, but could be further elevated in the event of treatment noncompliance and/or AODA.  Meds:  INCREASE rexulti 1 to 1.5 mg qhs. Risks, benefits, alternatives discussed with patient including increased energy, exacerbation of irritability, weight gain, akathisia, GI upset, tardive dyskinesia/dystonia, movement issues, extrapyramidal symptoms, orthostatic hypotension.  Use care when operating vehicle, vessel, or machine. After discussion of these risks and benefits, the patient voiced understanding and agreed to proceed.  CONTINUE lexapro 20 mg qday. Risks, benefits, alternatives discussed with patient including GI upset, nausea vomiting diarrhea, hyponatremia, theoretical decrease of seizure threshold predisposing the patient to a slightly higher seizure risk, headaches, sexual dysfunction, serotonin syndrome, bleeding risk, increased suicidality in patients 24 years and younger, switching to tenzin/hypomania.  After discussion of these risks and benefits, the patient voiced understanding and agreed to proceed.  RARELY USES hydroxyzine 25 mg qhs. Risks, benefits, alternatives discussed with patient including  sedation, dizziness, fall risk, GI upset, and risk of increased CNS depression and elevated heart rate if taken with other antihistamines.  Use care when operating vehicle, vessel, or machine. After discussion of these risks and benefits, the patient voiced understanding and agreed to proceed.  Labs: none    Patient screened positive for depression based on a PHQ-9 score of  on . Follow-up recommendations include: Prescribed antidepressant medication treatment and Suicide Risk Assessment performed.           TREATMENT PLAN/GOALS: Continue supportive psychotherapy efforts and medications as indicated. Treatment and medication options discussed during today's visit. Patient acknowledged and verbally consented to continue with current treatment plan and was educated on the importance of compliance with treatment and follow-up appointments.    MEDICATION ISSUES:  JASON reviewed as expected.  Discussed medication options and treatment plan of prescribed medication as well as the risks, benefits, and side effects including potential falls, possible impaired driving and metabolic adversities among others. Patient is agreeable to call the office with any worsening of symptoms or onset of side effects. Patient is agreeable to call 911 or go to the nearest ER should he/she begin having SI/HI. No medication side effects or related complaints today.     MEDS ORDERED DURING VISIT:  New Medications Ordered This Visit   Medications    Brexpiprazole (Rexulti) 1 MG tablet     Sig: Take 1.5 tablets by mouth Daily.     Dispense:  45 tablet     Refill:  5     Replaces 1 mg dose. Thank you for the help. Please call with questions: [phone].    escitalopram (Lexapro) 20 MG tablet     Sig: Take 1 tablet by mouth Daily.     Dispense:  90 tablet     Refill:  3       Return in about 6 weeks (around 2/10/2025).         This document has been electronically signed by Hiram Fitch MD  December 30, 2024 11:56 EST    Dictated Utilizing  Dragon Dictation: Part of this note may be an electronic transcription/translation of spoken language to printed text using the Dragon Dictation System.

## 2025-02-11 ENCOUNTER — OFFICE VISIT (OUTPATIENT)
Dept: PSYCHIATRY | Facility: CLINIC | Age: 30
End: 2025-02-11
Payer: COMMERCIAL

## 2025-02-11 VITALS
HEART RATE: 76 BPM | HEIGHT: 73 IN | DIASTOLIC BLOOD PRESSURE: 80 MMHG | BODY MASS INDEX: 31.73 KG/M2 | SYSTOLIC BLOOD PRESSURE: 130 MMHG | WEIGHT: 239.4 LBS

## 2025-02-11 DIAGNOSIS — F51.05 INSOMNIA DUE TO MENTAL CONDITION: ICD-10-CM

## 2025-02-11 DIAGNOSIS — F33.1 MAJOR DEPRESSIVE DISORDER, RECURRENT EPISODE, MODERATE: Primary | ICD-10-CM

## 2025-02-11 DIAGNOSIS — F41.0 PANIC ATTACKS: ICD-10-CM

## 2025-02-11 DIAGNOSIS — F41.1 GENERALIZED ANXIETY DISORDER: ICD-10-CM

## 2025-02-11 NOTE — PROGRESS NOTES
"Subjective   Reji Argueta is a 29 y.o. male who presents today for initial evaluation     Referring Provider:  No referring provider defined for this encounter.    Chief Complaint: Anxiety    History of Present Illness:    2024: INITIAL VISIT Chart review:     Ottoniel: blank  Care Everywhere: a few non behavioral health notes    Psychotropic medication chart review:  Present:  Lexapro 20 mg a day  Trazodone 100 mg at night  Rexulti    Previously:  2024: R/O ADHD, bipolar. Improving MDD, EVE on rexulti. Has been on adderall before, but he avoids it due to substance use hx. 5w    EKG: none  Procedures: none  Head imaging: none  Labs: none  Initial Chart Review Notes: Referred this month for depression.  Patient has a power of  in her father, Fercho.  Not much data.  Patient has missed or not responded to several calls made to set up follow-up.  These calls occurred from  through .      Chart Review By Dates:  2025: no visits.  2024: no visits.  24: no visits.    Plannin2024: MDD, EVE, restart rexulti (re-sent in).  2024: Improving, increase rexulti for MDD, EVE.  2024: R/O ADHD, bipolar. Improving MDD, EVE on rexulti. Has been on adderall before, but he avoids it due to substance use hx. 5w    VISITS/APPOINTMENTS (BELOW):    \"Reji\"  \"Nervous laugh\"  Has been on adderall before, but avoids 2/2 substance use hx  R/o bipolar    2025:   In person interview:  \"I am on it now.\"  \"Overall I'm fine\"  Spending a lot of time in [place]  Can I get a therapist? My  recommends one  Some irritability at home with parents  Dad just finished radiation, may have other tumors in his body, starts chemo soon.  MDD: stable?  EVE: fairly stable, some irritability   Panic attacks: yes  Energy: stable  Concentration: chronically low  Insomnia: stable  AIMS if on antipsychotic: no abnl movements  Eating/Weight: 239, 229, 224 " "lbs  Refills: y  Substances: 1 ppd, drinks 1x/mo, no cannabis but using OTC delta9 vape  Therapy: n  Medication compliant: y  SE: n  No SI HI AVH.      12/30/2024:   In person interview:  \"I've been ok, but I haven't been taking the medication.\"  Dad just had brain surgery, now in rehab. Had brain cancer.  I'm back to where I was.  Ruminating  Mom noticed I'm not on it. More negative, maybe more irritable  Bouts of depression are worse  Not really using any substances  MDD: mild depressed mood  EVE: agoraphobia, irritable  Panic attacks: yes  Energy: improving  Concentration: chronically low  Insomnia: stable  AIMS if on antipsychotic: no abnl movements  Eating/Weight: 229, 224 lbs  Refills: y  Substances: 1 ppd, drinks 1x/mo, no cannabis but using OTC delta9 vape  Therapy: n  Medication compliant: y  SE: n  No SI HI AVH.      11/08/2024:   In person interview:  \"Good.\"  Plateaued. People around me say I don't have mood swings.  I still have bouts of depression  Plans on going back to work soon  Anxiety and depression both improved  MDD: mild depressed mood  EVE: agoraphobia  Panic attacks: yes  Energy: improving  Concentration: chronically low  Insomnia: stable  AIMS if on antipsychotic:   Eating/Weight: 224 lbs  Refills: y  Substances: 1 ppd, drinks 1x/mo, no cannabis but using OTC delta9 vape  Therapy: n  Medication compliant: y  SE: n  No SI HI AVH.      09/30/2024:   In person Interview:  His/Her Story: \"Sometimes I have trouble taking care of myself.\"  Escitalopram is good to help depression.  Rexulti has been helping, it has been 3 weeks  Dreams are vivid though  Reluctant to go to sleep  Reminiscenses begin, like embarrassing memories  Rexulti helping with that.  P16, G10  Sleeping? improving  Eating? stable  Energy? Improving possibly  AIMS: no abnormal movements  Depression/Mood: improving  Depressed mood improving  poor energy improving  Insomnia improving.  Seasonal pattern: def  Severity: moderate, but " improving  Duration: On and off for years  Anxiety:  Uncontrolled worrying, feeling on edge or restless, irritability,   insomnia.  Severity: mild  Duration: On and off for years  Panic attacks: stable  AIMS if on antipsychotic: no abnormal movements  Psych ROS: Positive for depression, anxiety.  Negative for psychosis   Diya is unclear.  ADHD: def  PTSD: def  No SI HI AVH.  Medication compliant: y    Access to Firearms: denies    PHQ-9 Depression Screening  PHQ-9 Total Score:      Little interest or pleasure in doing things?     Feeling down, depressed, or hopeless?     Trouble falling or staying asleep, or sleeping too much?     Feeling tired or having little energy?     Poor appetite or overeating?     Feeling bad about yourself - or that you are a failure or have let yourself or your family down?     Trouble concentrating on things, such as reading the newspaper or watching television?     Moving or speaking so slowly that other people could have noticed? Or the opposite - being so fidgety or restless that you have been moving around a lot more than usual?     Thoughts that you would be better off dead, or of hurting yourself in some way?     PHQ-9 Total Score       EVE-7       Past Surgical History:  Past Surgical History:   Procedure Laterality Date    TONSILLECTOMY         Problem List:  There is no problem list on file for this patient.      Allergy:   No Known Allergies     Discontinued Medications:  There are no discontinued medications.          Current Medications:   Current Outpatient Medications   Medication Sig Dispense Refill    Brexpiprazole (Rexulti) 1 MG tablet Take 1.5 tablets by mouth Daily. 45 tablet 5    Cholecalciferol (VITAMIN D-3 PO) Take  by mouth.      escitalopram (Lexapro) 20 MG tablet Take 1 tablet by mouth Daily. 90 tablet 3    ferrous sulfate 325 (65 Fe) MG tablet       omeprazole (priLOSEC) 40 MG capsule       rOPINIRole (REQUIP) 0.5 MG tablet Every 8 (Eight) Hours.      vitamin D3  "125 MCG (5000 UT) capsule capsule 1 capsule Daily.      dicyclomine (BENTYL) 10 MG capsule  (Patient not taking: Reported on 2/11/2025)      Omega-3 Fatty Acids (fish oil) 1000 MG capsule capsule Fish Oil 1,000 mg (120 mg-180 mg) oral capsule take 1 capsule by oral route daily   Active (Patient not taking: Reported on 2/11/2025)       No current facility-administered medications for this visit.       Past Medical History:  Past Medical History:   Diagnosis Date    Anxiety     Depression        Past Psychiatric History:  Began Treatment: 12 - 13 years ago    Depression began at 13 yo  15 yo went to a [facility] therapist for 3x    Seeing [facility] since about 16 yo    Diagnoses: MDD, EVE  Psychiatrist:Denies  Therapist: yes in the past  Admission History:Denies  Medication Trials:    Celexa at 15 yo didn't work at all      Self Harm: Denies  Suicide Attempts:Denies      Substance Abuse History:   Types: 1 ppd, hx of cannabis, 20 - 23 yo was \"an alcoholic\", was on meth for 2 years, sober since 7/26/20  Withdrawal Symptoms:Denies  Longest Period Sober:Not Applicable   AA: none    Social History:  Martial Status:Single  Employed:No  Kids:No  House:Lives in a house   History: Denies    Social History     Socioeconomic History    Marital status: Single   Tobacco Use    Smoking status: Every Day     Current packs/day: 0.50     Average packs/day: 0.7 packs/day for 16.5 years (12.0 ttl pk-yrs)     Types: Cigarettes     Passive exposure: Current    Smokeless tobacco: Former   Vaping Use    Vaping status: Every Day    Substances: Nicotine   Substance and Sexual Activity    Alcohol use: Not Currently    Drug use: Not Currently     Types: Cocaine(coke), Marijuana, Methamphetamines     Comment: occ MARIJUANA    Sexual activity: Not Currently       Family History:   Suicide Attempts: Denies  Suicide Completions:Denies      History reviewed. No pertinent family history.    Developmental History:       Childhood: Denies Abuse, " "raised Christian  High School:Completed  College: some, one semester    Mental Status Exam  Appearance  : groomed, good eye contact, normal street clothes  Behavior  : pleasant and cooperative  Motor  : No abnormal  Speech  :normal rhythm, rate, volume, tone, not hyperverbal, not pressured, normal prosidy  Mood  : \"I'm not sure\"  Affect  : euthymic, mood congruent, good variability  Thought Content  : negative suicidal ideations, negative homicidal ideations, negative obsessions  Perceptions  : negative auditory hallucinations, negative visual hallucinations  Thought Process  : linear  Insight/Judgement  : Fair/fair  Cognition  : grossly intact  Attention   : intact      Review of Systems:  Review of Systems   Constitutional:  Negative for appetite change, diaphoresis and fatigue.   HENT:  Negative for drooling.    Eyes:  Negative for visual disturbance.   Respiratory:  Negative for cough, chest tightness and shortness of breath.    Cardiovascular:  Negative for chest pain, palpitations and leg swelling.   Gastrointestinal:  Negative for abdominal pain, diarrhea, nausea and vomiting.   Endocrine: Negative for cold intolerance and heat intolerance.   Genitourinary:  Negative for difficulty urinating.   Musculoskeletal:  Negative for joint swelling.   Allergic/Immunologic: Negative for immunocompromised state.   Neurological:  Negative for dizziness, seizures, speech difficulty, light-headedness, numbness and headaches.   Psychiatric/Behavioral:  Negative for behavioral problems, confusion and sleep disturbance.        Physical Exam:  Physical Exam    Vital Signs:   /80   Pulse 76   Ht 185.4 cm (73\")   Wt 109 kg (239 lb 6.4 oz)   BMI 31.59 kg/m²      Lab Results:   No visits with results within 6 Month(s) from this visit.   Latest known visit with results is:   Admission on 08/20/2021, Discharged on 08/20/2021   Component Date Value Ref Range Status    SARS Antigen 08/20/2021 Not Detected  Not Detected Final " "   Internal Control 08/20/2021 Passed  Passed Final    Lot Number 08/20/2021 [id number]   Final    Expiration Date 08/20/2021 10,523   Final       EKG Results:  No orders to display       Imaging Results:  No Images in the past 120 days found..      Assessment & Plan   Diagnoses and all orders for this visit:    1. Major depressive disorder, recurrent episode, moderate (Primary)  -     Ambulatory Referral to Behavioral Health    2. Generalized anxiety disorder  -     Ambulatory Referral to Behavioral Health    3. Insomnia due to mental condition  -     Ambulatory Referral to Behavioral Health    4. Panic attacks  -     Ambulatory Referral to Behavioral Health        Visit Diagnoses:    ICD-10-CM ICD-9-CM   1. Major depressive disorder, recurrent episode, moderate  F33.1 296.32   2. Generalized anxiety disorder  F41.1 300.02   3. Insomnia due to mental condition  F51.05 300.9     327.02   4. Panic attacks  F41.0 300.01     02/11/2025: Harris to discuss \"spirituality conflict,\" possible family conflict. Never tested for ADHD. Consider clonidine, lamictal.    Acknowledged and normalized patient's thoughts, feelings, and concerns. Allowed patient to freely discuss and process issues, such as:  Anxiety at work.  Anxiety and depression regarding relationships.  ... using Rogerian psychotherapeutic techniques including unconditional positive regard, reflective listening, and demonstrating clear empathy, with the goal of ameliorating symptoms and maintaining, restoring, or improving self-esteem, adaptive skills, and ego or psychological functions (Fox et al. 1991), the long-term goal of which is to develop a better, healthier perspective and help the patient bear their circumstances more easily.  Time (minutes) spent providing supportive psychotherapy: 16  (This time is exclusive to the therapy session and separate from the time spent on activities used to meet the criteria for the E/M service (history, exam, medical " decision-making).)  Start: 1:16  Stop: 1:32  Functional status: mild impairment  Treatment plan: Medication management and supportive psychotherapy  Prognosis: good  Progress: MDD, EVE -- unclear what the status is  6w    12/30/2024: MDD, EVE, restart rexulti (re-sent in).    11/08/2024: Improving, increase rexulti for MDD, EVE.    09/30/2024: R/O ADHD, bipolar. Improving MDD, EVE on rexulti. Has been on adderall before, but he avoids it due to substance use hx. 5w    PLAN:  Safety: No acute safety concerns  Therapy: None, maybe later  Risk Assessment: Risk of self-harm acutely is moderate.  Risk factors include anxiety disorder, mood disorder, AODA hx, and recent psychosocial stressors (pandemic). Protective factors include no family history, denies access to guns/weapons, no present SI, no history of suicide attempts or self-harm in the past, healthcare seeking, future orientation, willingness to engage in care.  Risk of self-harm chronically is also moderate, but could be further elevated in the event of treatment noncompliance and/or AODA.  Meds:  CONTINUE rexulti 1.5 mg qhs. Risks, benefits, alternatives discussed with patient including increased energy, exacerbation of irritability, weight gain, akathisia, GI upset, tardive dyskinesia/dystonia, movement issues, extrapyramidal symptoms, orthostatic hypotension.  Use care when operating vehicle, vessel, or machine. After discussion of these risks and benefits, the patient voiced understanding and agreed to proceed.  CONTINUE lexapro 20 mg qday. Risks, benefits, alternatives discussed with patient including GI upset, nausea vomiting diarrhea, hyponatremia, theoretical decrease of seizure threshold predisposing the patient to a slightly higher seizure risk, headaches, sexual dysfunction, serotonin syndrome, bleeding risk, increased suicidality in patients 24 years and younger, switching to tenzin/hypomania.  After discussion of these risks and benefits, the patient  voiced understanding and agreed to proceed.  RARELY USES hydroxyzine 25 mg qhs. Risks, benefits, alternatives discussed with patient including sedation, dizziness, fall risk, GI upset, and risk of increased CNS depression and elevated heart rate if taken with other antihistamines.  Use care when operating vehicle, vessel, or machine. After discussion of these risks and benefits, the patient voiced understanding and agreed to proceed.  Labs: none    Patient screened positive for depression based on a PHQ-9 score of  on . Follow-up recommendations include: Prescribed antidepressant medication treatment and Suicide Risk Assessment performed.           TREATMENT PLAN/GOALS: Continue supportive psychotherapy efforts and medications as indicated. Treatment and medication options discussed during today's visit. Patient acknowledged and verbally consented to continue with current treatment plan and was educated on the importance of compliance with treatment and follow-up appointments.    MEDICATION ISSUES:  JASON reviewed as expected.  Discussed medication options and treatment plan of prescribed medication as well as the risks, benefits, and side effects including potential falls, possible impaired driving and metabolic adversities among others. Patient is agreeable to call the office with any worsening of symptoms or onset of side effects. Patient is agreeable to call 911 or go to the nearest ER should he/she begin having SI/HI. No medication side effects or related complaints today.     MEDS ORDERED DURING VISIT:  No orders of the defined types were placed in this encounter.      Return in about 6 weeks (around 3/25/2025).         This document has been electronically signed by Hiram Fitch MD  February 11, 2025 13:38 EST    Dictated Utilizing Dragon Dictation: Part of this note may be an electronic transcription/translation of spoken language to printed text using the Dragon Dictation System.

## 2025-02-23 DIAGNOSIS — F41.1 GENERALIZED ANXIETY DISORDER: ICD-10-CM

## 2025-02-23 DIAGNOSIS — F41.0 PANIC ATTACKS: ICD-10-CM

## 2025-02-23 DIAGNOSIS — F33.1 MAJOR DEPRESSIVE DISORDER, RECURRENT EPISODE, MODERATE: ICD-10-CM

## 2025-02-23 DIAGNOSIS — F51.05 INSOMNIA DUE TO MENTAL CONDITION: ICD-10-CM

## 2025-02-24 RX ORDER — ESCITALOPRAM OXALATE 20 MG/1
20 TABLET ORAL DAILY
Qty: 90 TABLET | Refills: 3 | OUTPATIENT
Start: 2025-02-24

## 2025-02-24 NOTE — TELEPHONE ENCOUNTER
NEXT VISIT WITH PROVIDER   Appointment with Hiram Fitch MD (03/25/2025)     LAST SEEN BY PROVIDER   Office Visit with Hiram Fitch MD (02/11/2025)     LAST MED REFILL  escitalopram (Lexapro) 20 MG tablet (12/30/2024)     TOO SOON FOR REFILL     PROVIDER PLEASE ADVISE

## 2025-03-05 DIAGNOSIS — F51.05 INSOMNIA DUE TO MENTAL CONDITION: ICD-10-CM

## 2025-03-05 DIAGNOSIS — F41.1 GENERALIZED ANXIETY DISORDER: ICD-10-CM

## 2025-03-05 DIAGNOSIS — F41.0 PANIC ATTACKS: ICD-10-CM

## 2025-03-05 DIAGNOSIS — F33.1 MAJOR DEPRESSIVE DISORDER, RECURRENT EPISODE, MODERATE: ICD-10-CM

## 2025-03-06 RX ORDER — ESCITALOPRAM OXALATE 20 MG/1
20 TABLET ORAL DAILY
Qty: 90 TABLET | Refills: 3 | OUTPATIENT
Start: 2025-03-06

## 2025-03-25 ENCOUNTER — OFFICE VISIT (OUTPATIENT)
Dept: PSYCHIATRY | Facility: CLINIC | Age: 30
End: 2025-03-25
Payer: COMMERCIAL

## 2025-03-25 VITALS
WEIGHT: 236.4 LBS | HEART RATE: 90 BPM | SYSTOLIC BLOOD PRESSURE: 136 MMHG | HEIGHT: 73 IN | DIASTOLIC BLOOD PRESSURE: 77 MMHG | BODY MASS INDEX: 31.33 KG/M2

## 2025-03-25 DIAGNOSIS — F41.1 GENERALIZED ANXIETY DISORDER: ICD-10-CM

## 2025-03-25 DIAGNOSIS — F51.05 INSOMNIA DUE TO MENTAL CONDITION: ICD-10-CM

## 2025-03-25 DIAGNOSIS — F33.1 MAJOR DEPRESSIVE DISORDER, RECURRENT EPISODE, MODERATE: Primary | ICD-10-CM

## 2025-03-25 DIAGNOSIS — F41.0 PANIC ATTACKS: ICD-10-CM

## 2025-03-25 RX ORDER — OMEGA-3-ACID ETHYL ESTERS 1 G/1
CAPSULE, LIQUID FILLED ORAL EVERY 12 HOURS SCHEDULED
COMMUNITY
Start: 2025-03-14

## 2025-03-25 RX ORDER — ATORVASTATIN CALCIUM 10 MG/1
TABLET, FILM COATED ORAL
COMMUNITY
Start: 2025-03-14

## 2025-03-25 RX ORDER — TRAZODONE HYDROCHLORIDE 100 MG/1
100 TABLET ORAL NIGHTLY
COMMUNITY
End: 2025-03-25 | Stop reason: SDUPTHER

## 2025-03-25 RX ORDER — HYDROXYZINE HYDROCHLORIDE 10 MG/1
10 TABLET, FILM COATED ORAL 3 TIMES DAILY PRN
Qty: 180 TABLET | Refills: 1 | Status: SHIPPED | OUTPATIENT
Start: 2025-03-25

## 2025-03-25 RX ORDER — TRAZODONE HYDROCHLORIDE 100 MG/1
100 TABLET ORAL NIGHTLY
Qty: 90 TABLET | Refills: 1 | Status: SHIPPED | OUTPATIENT
Start: 2025-03-25

## 2025-03-25 RX ORDER — HYDROXYZINE HYDROCHLORIDE 10 MG/1
10 TABLET, FILM COATED ORAL 3 TIMES DAILY PRN
COMMUNITY
End: 2025-03-25 | Stop reason: SDUPTHER

## 2025-03-25 NOTE — TREATMENT PLAN
Anxiety:  6/10 progressing    Goals:  Patient will develop and implement behavioral and cognitive strategies to reduce anxiety and irrational fears, monthly, using Rogerian psychotherapy and CBT where appropriate.  Help patient explore past emotional issues in relation to present anxiety, monthly, until remission of symptoms, using Rogerian psychotherapy and CBT where appropriate.  Help patient develop an awareness of their cognitive and physical responses to anxiety, monthly, until remission of symptoms, using Rogerian psychotherapy and CBT where appropriate.          Depression:  6/10 progressing    Goals:  Patient will demonstrate the ability to initiate new constructive life skills outside of sessions on a consistent basis, monthly, using Rogerian psychotherapy and CBT where appropriate.  Assist patient in setting attainable activities of daily living goals, monthly, using Rogerian psychotherapy and CBT where appropriate.  Provide education about depression, monthly, using Rogerian psychotherapy and CBT where appropriate.  Assist patient in developing healthy coping strategies, monthly, using Rogerian psychotherapy and CBT where appropriate.    Rogerian psychotherapy and CBT will be used to help accomplish the above goals and manage depression and anxiety related to  relationships, family conflict, work stresses       I have discussed and reviewed this treatment plan with the patient.      Reviewed by Hiram Fitch MD   03/25/2025

## 2025-03-25 NOTE — PROGRESS NOTES
"Subjective   Reji Argueta is a 29 y.o. male who presents today for initial evaluation     Referring Provider:  No referring provider defined for this encounter.    Chief Complaint: Anxiety    History of Present Illness:    2024: INITIAL VISIT Chart review:     Ottoniel: blank  Care Everywhere: a few non behavioral health notes    Psychotropic medication chart review:  Present:  Lexapro 20 mg a day  Trazodone 100 mg at night  Rexulti    Previously:  2024: R/O ADHD, bipolar. Improving MDD, EVE on rexulti. Has been on adderall before, but he avoids it due to substance use hx. 5w    EKG: none  Procedures: none  Head imaging: none  Labs: none  Initial Chart Review Notes: Referred this month for depression.  Patient has a power of  in her father, Fercho.  Not much data.  Patient has missed or not responded to several calls made to set up follow-up.  These calls occurred from  through .      Chart Review By Dates:  2025: no visits.  2025: no visits.  2024: no visits.  24: no visits.    Plannin2025: Nathan to discuss \"spirituality conflict,\" possible family conflict. Never tested for ADHD. Consider clonidine, lamictal.  2024: MDD, EVE, restart rexulti (re-sent in).  2024: Improving, increase rexulti for MDD, EVE.    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  Will start targeted radiation on them soon  Dad's been meaner to Mom. The chemo has been affecting his brain.  Mom's having a rough time.  Dad's prognosis: chemo indefinitely  \"I think I'm doing pretty good with meds. I'm in a good spot.\"  Roommate is in [place], Dad's in [facility]  Rexulti keeps my mood under control  MDD: stable, no mood instability  EVE: fairly " "stable  Panic attacks: stable  Energy: stable  Concentration: chronically low  Insomnia: stable  AIMS if on antipsychotic: no abnl movements  Eating/Weight: 236, 239, 229, 224 lbs  Refills: y  Substances: 1 ppd, rare alcohol, using OTC delta9 vape  Therapy: Reji 5/6/25  Medication compliant: y  SE: n  No SI HI AVH.      02/11/2025:   In person interview:  \"I am on it now.\"  \"Overall I'm fine\"  Spending a lot of time in [place]  Can I get a therapist? My  recommends one  Some irritability at home with parents  Dad just finished radiation, may have other tumors in his body, starts chemo soon.  MDD: stable?  EVE: fairly stable, some irritability   Panic attacks: yes  Energy: stable  Concentration: chronically low  Insomnia: stable  AIMS if on antipsychotic: no abnl movements  Eating/Weight: 239, 229, 224 lbs  Refills: y  Substances: 1 ppd, drinks 1x/mo, no cannabis but using OTC delta9 vape  Therapy: n  Medication compliant: y  SE: n  No SI HI AVH.      12/30/2024:   In person interview:  \"I've been ok, but I haven't been taking the medication.\"  Dad just had brain surgery, now in rehab. Had brain cancer.  I'm back to where I was.  Ruminating  Mom noticed I'm not on it. More negative, maybe more irritable  Bouts of depression are worse  Not really using any substances  MDD: mild depressed mood  EVE: agoraphobia, irritable  Panic attacks: yes  Energy: improving  Concentration: chronically low  Insomnia: stable  AIMS if on antipsychotic: no abnl movements  Eating/Weight: 229, 224 lbs  Refills: y  Substances: 1 ppd, drinks 1x/mo, no cannabis but using OTC delta9 vape  Therapy: n  Medication compliant: y  SE: n  No SI HI AVH.      11/08/2024:   In person interview:  \"Good.\"  Plateaued. People around me say I don't have mood swings.  I still have bouts of depression  Plans on going back to work soon  Anxiety and depression both improved  MDD: mild depressed mood  EVE: agoraphobia  Panic attacks: yes  Energy: " "improving  Concentration: chronically low  Insomnia: stable  AIMS if on antipsychotic:   Eating/Weight: 224 lbs  Refills: y  Substances: 1 ppd, drinks 1x/mo, no cannabis but using OTC delta9 vape  Therapy: n  Medication compliant: y  SE: n  No SI HI AVH.      09/30/2024:   In person Interview:  His/Her Story: \"Sometimes I have trouble taking care of myself.\"  Escitalopram is good to help depression.  Rexulti has been helping, it has been 3 weeks  Dreams are vivid though  Reluctant to go to sleep  Reminiscenses begin, like embarrassing memories  Rexulti helping with that.  P16, G10  Sleeping? improving  Eating? stable  Energy? Improving possibly  AIMS: no abnormal movements  Depression/Mood: improving  Depressed mood improving  poor energy improving  Insomnia improving.  Seasonal pattern: def  Severity: moderate, but improving  Duration: On and off for years  Anxiety:  Uncontrolled worrying, feeling on edge or restless, irritability,   insomnia.  Severity: mild  Duration: On and off for years  Panic attacks: stable  AIMS if on antipsychotic: no abnormal movements  Psych ROS: Positive for depression, anxiety.  Negative for psychosis   Diya is unclear.  ADHD: def  PTSD: def  No SI HI AVH.  Medication compliant: y    Access to Firearms: denies    PHQ-9 Depression Screening  PHQ-9 Total Score:      Little interest or pleasure in doing things?     Feeling down, depressed, or hopeless?     Trouble falling or staying asleep, or sleeping too much?     Feeling tired or having little energy?     Poor appetite or overeating?     Feeling bad about yourself - or that you are a failure or have let yourself or your family down?     Trouble concentrating on things, such as reading the newspaper or watching television?     Moving or speaking so slowly that other people could have noticed? Or the opposite - being so fidgety or restless that you have been moving around a lot more than usual?     Thoughts that you would be better off " dead, or of hurting yourself in some way?     PHQ-9 Total Score       EVE-7  Feeling nervous, anxious or on edge: More than half the days  Not being able to stop or control worrying: Several days  Worrying too much about different things: Several days  Trouble Relaxing: Several days  Being so restless that it is hard to sit still: More than half the days  Feeling afraid as if something awful might happen: More than half the days  Becoming easily annoyed or irritable: Several days  EVE 7 Total Score: 10  If you checked any problems, how difficult have these problems made it for you to do your work, take care of things at home, or get along with other people: Very difficult    Past Surgical History:  Past Surgical History:   Procedure Laterality Date    TONSILLECTOMY         Problem List:  There is no problem list on file for this patient.      Allergy:   No Known Allergies     Discontinued Medications:  Medications Discontinued During This Encounter   Medication Reason    hydrOXYzine (ATARAX) 10 MG tablet Reorder    traZODone (DESYREL) 100 MG tablet Reorder             Current Medications:   Current Outpatient Medications   Medication Sig Dispense Refill    atorvastatin (LIPITOR) 10 MG tablet       Brexpiprazole (Rexulti) 1 MG tablet Take 1.5 tablets by mouth Daily. 45 tablet 5    Cholecalciferol (VITAMIN D-3 PO) Take  by mouth.      escitalopram (Lexapro) 20 MG tablet Take 1 tablet by mouth Daily. 90 tablet 3    ferrous sulfate 325 (65 Fe) MG tablet       hydrOXYzine (ATARAX) 10 MG tablet Take 1 tablet by mouth 3 (Three) Times a Day As Needed for Itching. 180 tablet 1    omega-3 acid ethyl esters (LOVAZA) 1 g capsule Every 12 (Twelve) Hours.      omeprazole (priLOSEC) 40 MG capsule       rOPINIRole (REQUIP) 0.5 MG tablet Every 8 (Eight) Hours.      traZODone (DESYREL) 100 MG tablet Take 1 tablet by mouth Every Night. 90 tablet 1    vitamin D3 125 MCG (5000 UT) capsule capsule 1 capsule Daily.      dicyclomine  "(BENTYL) 10 MG capsule  (Patient not taking: Reported on 12/30/2024)      Omega-3 Fatty Acids (fish oil) 1000 MG capsule capsule Fish Oil 1,000 mg (120 mg-180 mg) oral capsule take 1 capsule by oral route daily   Active (Patient not taking: Reported on 2/11/2025)       No current facility-administered medications for this visit.       Past Medical History:  Past Medical History:   Diagnosis Date    Anxiety     Depression        Past Psychiatric History:  Began Treatment: 12 - 13 years ago    Depression began at 11 yo  15 yo went to a Rastafari therapist for 3x    Seeing Rose[facility] since about 18 yo    Diagnoses: MDD, EVE  Psychiatrist:Denies  Therapist: yes in the past  Admission History:Denies  Medication Trials:    Celexa at 15 yo didn't work at all      Self Harm: Denies  Suicide Attempts:Denies      Substance Abuse History:   Types: 1 ppd, hx of cannabis, 20 - 23 yo was \"an alcoholic\", was on meth for 2 years, sober since 7/26/20  Withdrawal Symptoms:Denies  Longest Period Sober:Not Applicable   AA: none    Social History:  Martial Status:Single  Employed:No  Kids:No  House:Lives in a house   History: Denies    Social History     Socioeconomic History    Marital status: Single   Tobacco Use    Smoking status: Every Day     Current packs/day: 0.50     Average packs/day: 0.7 packs/day for 16.5 years (12.0 ttl pk-yrs)     Types: Cigarettes     Passive exposure: Current    Smokeless tobacco: Former   Vaping Use    Vaping status: Every Day    Substances: Nicotine   Substance and Sexual Activity    Alcohol use: Not Currently    Drug use: Not Currently     Types: Cocaine(coke), Marijuana, Methamphetamines     Comment: occ MARIJUANA    Sexual activity: Not Currently       Family History:   Suicide Attempts: Denies  Suicide Completions:Denies      History reviewed. No pertinent family history.    Developmental History:       Childhood: Denies Abuse, raised Rastafari  High School:Completed  College: some, one " "semester    Mental Status Exam  Appearance  : groomed, good eye contact, normal street clothes  Behavior  : pleasant and cooperative  Motor  : No abnormal  Speech  :normal rhythm, rate, volume, tone, not hyperverbal, not pressured, normal prosidy  Mood  : \"I'm in a pretty good spot\"  Affect  : euthymic, mood congruent, good variability  Thought Content  : negative suicidal ideations, negative homicidal ideations, negative obsessions  Perceptions  : negative auditory hallucinations, negative visual hallucinations  Thought Process  : linear  Insight/Judgement  : Fair/fair  Cognition  : grossly intact  Attention   : intact      Review of Systems:  Review of Systems   Constitutional:  Negative for diaphoresis and fatigue.   HENT:  Negative for drooling.    Eyes:  Negative for visual disturbance.   Respiratory:  Negative for cough, chest tightness and shortness of breath.    Cardiovascular:  Negative for chest pain, palpitations and leg swelling.   Gastrointestinal:  Negative for abdominal pain, diarrhea, nausea and vomiting.   Endocrine: Negative for cold intolerance and heat intolerance.   Genitourinary:  Negative for difficulty urinating.   Musculoskeletal:  Negative for joint swelling.   Allergic/Immunologic: Negative for immunocompromised state.   Neurological:  Negative for dizziness, seizures, speech difficulty, light-headedness, numbness and headaches.   Psychiatric/Behavioral:  Negative for behavioral problems, confusion and sleep disturbance.        Physical Exam:  Physical Exam    Vital Signs:   /77   Pulse 90   Ht 185.4 cm (73\")   Wt 107 kg (236 lb 6.4 oz)   BMI 31.19 kg/m²      Lab Results:   No visits with results within 6 Month(s) from this visit.   Latest known visit with results is:   Admission on 08/20/2021, Discharged on 08/20/2021   Component Date Value Ref Range Status    SARS Antigen 08/20/2021 Not Detected  Not Detected Final    Internal Control 08/20/2021 Passed  Passed Final    Lot " Number 08/20/2021 [id number]   Final    Expiration Date 08/20/2021 10,523   Final       EKG Results:  No orders to display       Imaging Results:  No Images in the past 120 days found..      Assessment & Plan   Diagnoses and all orders for this visit:    1. Major depressive disorder, recurrent episode, moderate (Primary)    2. Generalized anxiety disorder  -     hydrOXYzine (ATARAX) 10 MG tablet; Take 1 tablet by mouth 3 (Three) Times a Day As Needed for Itching.  Dispense: 180 tablet; Refill: 1    3. Insomnia due to mental condition  -     hydrOXYzine (ATARAX) 10 MG tablet; Take 1 tablet by mouth 3 (Three) Times a Day As Needed for Itching.  Dispense: 180 tablet; Refill: 1  -     traZODone (DESYREL) 100 MG tablet; Take 1 tablet by mouth Every Night.  Dispense: 90 tablet; Refill: 1    4. Panic attacks  -     hydrOXYzine (ATARAX) 10 MG tablet; Take 1 tablet by mouth 3 (Three) Times a Day As Needed for Itching.  Dispense: 180 tablet; Refill: 1        Visit Diagnoses:    ICD-10-CM ICD-9-CM   1. Major depressive disorder, recurrent episode, moderate  F33.1 296.32   2. Generalized anxiety disorder  F41.1 300.02   3. Insomnia due to mental condition  F51.05 300.9     327.02   4. Panic attacks  F41.0 300.01     03/25/2025: Improved, stable, no changes. Discussed father.    Acknowledged and normalized patient's thoughts, feelings, and concerns. Allowed patient to freely discuss and process issues, such as:  Anxiety at work.  Anxiety and depression regarding relationships.  Anxiety and depression regarding family's well-being.  ... using Rogerian psychotherapeutic techniques including unconditional positive regard, reflective listening, and demonstrating clear empathy, with the goal of ameliorating symptoms and maintaining, restoring, or improving self-esteem, adaptive skills, and ego or psychological functions (Fox et al. 1991), the long-term goal of which is to develop a better, healthier perspective and help the patient  "bear their circumstances more easily.  Time (minutes) spent providing supportive psychotherapy: 16  (This time is exclusive to the therapy session and separate from the time spent on activities used to meet the criteria for the E/M service (history, exam, medical decision-making).)  Start: 2:24  Stop: 2:40  Functional status: mild impairment  Treatment plan: Medication management and supportive psychotherapy  Prognosis: good  Progress: MDD, EVE -- improving  7w    02/11/2025: [place] to discuss \"spirituality conflict,\" possible family conflict. Never tested for ADHD. Consider clonidine, lamictal.    12/30/2024: MDD, EVE, restart rexulti (re-sent in).    11/08/2024: Improving, increase rexulti for MDD, EVE.    09/30/2024: R/O ADHD, bipolar. Improving MDD, EVE on rexulti. Has been on adderall before, but he avoids it due to substance use hx. 5w    PLAN:  Safety: No acute safety concerns  Therapy: None, maybe later  Risk Assessment: Risk of self-harm acutely is moderate.  Risk factors include anxiety disorder, mood disorder, AODA hx, and recent psychosocial stressors (pandemic). Protective factors include no family history, denies access to guns/weapons, no present SI, no history of suicide attempts or self-harm in the past, healthcare seeking, future orientation, willingness to engage in care.  Risk of self-harm chronically is also moderate, but could be further elevated in the event of treatment noncompliance and/or AODA.  Meds:  CONTINUE rexulti 1.5 mg qhs. Risks, benefits, alternatives discussed with patient including increased energy, exacerbation of irritability, weight gain, akathisia, GI upset, tardive dyskinesia/dystonia, movement issues, extrapyramidal symptoms, orthostatic hypotension.  Use care when operating vehicle, vessel, or machine. After discussion of these risks and benefits, the patient voiced understanding and agreed to proceed.  CONTINUE lexapro 20 mg qday. Risks, benefits, alternatives discussed " with patient including GI upset, nausea vomiting diarrhea, hyponatremia, theoretical decrease of seizure threshold predisposing the patient to a slightly higher seizure risk, headaches, sexual dysfunction, serotonin syndrome, bleeding risk, increased suicidality in patients 24 years and younger, switching to tenzin/hypomania.  After discussion of these risks and benefits, the patient voiced understanding and agreed to proceed.  CONTINUE hydroxyzine 10 mg BID. Risks, benefits, alternatives discussed with patient including sedation, dizziness, fall risk, GI upset, and risk of increased CNS depression and elevated heart rate if taken with other antihistamines.  Use care when operating vehicle, vessel, or machine. After discussion of these risks and benefits, the patient voiced understanding and agreed to proceed.  CONTINUE trazodone 100 mg qhs. Risks, benefits, side effects discussed with patient including GI upset, sedation, dizziness/falls risk, grogginess the following day, prolongation of the QTc interval.  Do not use before operating vehicle, vessel, or machine. After discussion of these risks and benefits, the patient voiced understanding and agreed to proceed.    Labs: none    Patient screened positive for depression based on a PHQ-9 score of 16 on 3/25/2025. Follow-up recommendations include: Prescribed antidepressant medication treatment and Suicide Risk Assessment performed.           TREATMENT PLAN/GOALS: Continue supportive psychotherapy efforts and medications as indicated. Treatment and medication options discussed during today's visit. Patient acknowledged and verbally consented to continue with current treatment plan and was educated on the importance of compliance with treatment and follow-up appointments.    MEDICATION ISSUES:  JASON reviewed as expected.  Discussed medication options and treatment plan of prescribed medication as well as the risks, benefits, and side effects including potential  falls, possible impaired driving and metabolic adversities among others. Patient is agreeable to call the office with any worsening of symptoms or onset of side effects. Patient is agreeable to call 911 or go to the nearest ER should he/she begin having SI/HI. No medication side effects or related complaints today.     MEDS ORDERED DURING VISIT:  New Medications Ordered This Visit   Medications    hydrOXYzine (ATARAX) 10 MG tablet     Sig: Take 1 tablet by mouth 3 (Three) Times a Day As Needed for Itching.     Dispense:  180 tablet     Refill:  1     Refills. Thank you for the help. Please call with questions: [phone].    traZODone (DESYREL) 100 MG tablet     Sig: Take 1 tablet by mouth Every Night.     Dispense:  90 tablet     Refill:  1       Return in about 7 weeks (around 5/13/2025).         This document has been electronically signed by Hiram Fitch MD  March 25, 2025 14:45 EDT    Dictated Utilizing Dragon Dictation: Part of this note may be an electronic transcription/translation of spoken language to printed text using the Dragon Dictation System.

## 2025-05-06 ENCOUNTER — TELEMEDICINE (OUTPATIENT)
Dept: PSYCHIATRY | Facility: CLINIC | Age: 30
End: 2025-05-06
Payer: COMMERCIAL

## 2025-05-06 DIAGNOSIS — F33.1 MODERATE EPISODE OF RECURRENT MAJOR DEPRESSIVE DISORDER: Primary | ICD-10-CM

## 2025-05-06 DIAGNOSIS — F41.1 GENERALIZED ANXIETY DISORDER: ICD-10-CM
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
## 2025-05-13 ENCOUNTER — OFFICE VISIT (OUTPATIENT)
Dept: PSYCHIATRY | Facility: CLINIC | Age: 30
End: 2025-05-13
Payer: COMMERCIAL

## 2025-05-13 ENCOUNTER — LAB (OUTPATIENT)
Facility: HOSPITAL | Age: 30
End: 2025-05-13
Payer: COMMERCIAL

## 2025-05-13 VITALS
WEIGHT: 240.8 LBS | SYSTOLIC BLOOD PRESSURE: 140 MMHG | DIASTOLIC BLOOD PRESSURE: 73 MMHG | HEIGHT: 73 IN | HEART RATE: 85 BPM | BODY MASS INDEX: 31.91 KG/M2

## 2025-05-13 DIAGNOSIS — F41.0 PANIC ATTACKS: ICD-10-CM

## 2025-05-13 DIAGNOSIS — F90.0 ADHD (ATTENTION DEFICIT HYPERACTIVITY DISORDER), INATTENTIVE TYPE: ICD-10-CM

## 2025-05-13 DIAGNOSIS — F33.1 MAJOR DEPRESSIVE DISORDER, RECURRENT EPISODE, MODERATE: Primary | ICD-10-CM

## 2025-05-13 DIAGNOSIS — F41.1 GENERALIZED ANXIETY DISORDER: ICD-10-CM

## 2025-05-13 DIAGNOSIS — F51.05 INSOMNIA DUE TO MENTAL CONDITION: ICD-10-CM

## 2025-05-13 DIAGNOSIS — F90.0 ADHD (ATTENTION DEFICIT HYPERACTIVITY DISORDER), INATTENTIVE TYPE: Primary | ICD-10-CM

## 2025-05-13 LAB
AMPHET+METHAMPHET UR QL: NEGATIVE
AMPHETAMINES UR QL: NEGATIVE
BARBITURATES UR QL SCN: NEGATIVE
BENZODIAZ UR QL SCN: NEGATIVE
BUPRENORPHINE SERPL-MCNC: NEGATIVE NG/ML
CANNABINOIDS SERPL QL: POSITIVE
COCAINE UR QL: NEGATIVE
FENTANYL UR-MCNC: NEGATIVE NG/ML
METHADONE UR QL SCN: NEGATIVE
OPIATES UR QL: NEGATIVE
OXYCODONE UR QL SCN: NEGATIVE
PCP UR QL SCN: NEGATIVE
TRICYCLICS UR QL SCN: NEGATIVE

## 2025-05-13 PROCEDURE — 80307 DRUG TEST PRSMV CHEM ANLYZR: CPT

## 2025-05-13 RX ORDER — LISDEXAMFETAMINE DIMESYLATE 30 MG/1
30 CAPSULE ORAL EVERY MORNING
Qty: 30 CAPSULE | Refills: 0 | Status: SHIPPED | OUTPATIENT
Start: 2025-05-13

## 2025-05-13 NOTE — PROGRESS NOTES
"Subjective   Reji Argueta is a 29 y.o. male who presents today for initial evaluation     Referring Provider:  No referring provider defined for this encounter.    Chief Complaint: Anxiety    History of Present Illness:    Chart Review By Dates:  05/13/2025: Reji x1.  03/25/2025: no visits.  02/11/2025: no visits.  12/30/2024: no visits.  11/8/24: no visits.    VISITS/APPOINTMENTS (BELOW):    \"Reji\"  \"Nervous laugh\"  Has been on adderall before, but avoids 2/2 substance use hx  R/o bipolar    05/13/2025:   In person interview:  \"Good I guess... a little bit of a blur.\"  I was diagnosed with ADHD at 19 yo, 18 yo  I wasn't diagnosed as a kid  Brother has ADHD, on vyvanse  We think Mom has it  Adderall gave me a high, vyvanse didn't  I cried last week, \"pissed a couple people off\"  [place] lonesome  Let people down  ADHD: poor focus, distracted, talking over me, forgetting things, losing things  MDD: stable, no mood instability  EVE: fairly stable  Panic attacks: stable  Energy: stable  Concentration: chronically low  Insomnia: stable  AIMS if on antipsychotic: no abnl movements  Eating/Weight: 240, 236, 239, 229, 224 lbs  Refills: y  Substances: 1 ppd, rare alcohol, using OTC delta9 vape  Therapy: Reji 5/6/25  Medication compliant: y  SE: n  No SI HI AVH.      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  Will start targeted radiation on them soon  Dad's been meaner to Mom. The chemo has been affecting his brain.  Mom's having a rough time.  Dad's prognosis: chemo indefinitely  \"I think I'm doing pretty good with meds. I'm in a good spot.\"  Roommate is in [place], Dad's in [facility]  Rexulti keeps my mood under control  MDD: stable, no mood instability  EVE: fairly stable  Panic attacks: stable  Energy: stable  Concentration: chronically low  Insomnia: stable  AIMS if on antipsychotic: no abnl movements  Eating/Weight: 236, 239, 229, " "224 lbs  Refills: y  Substances: 1 ppd, rare alcohol, using OTC delta9 vape  Therapy: Reji 5/6/25  Medication compliant: y  SE: n  No SI HI AVH.      02/11/2025:   In person interview:  \"I am on it now.\"  \"Overall I'm fine\"  Spending a lot of time in [place]  Can I get a therapist? My  recommends one  Some irritability at home with parents  Dad just finished radiation, may have other tumors in his body, starts chemo soon.  MDD: stable?  EVE: fairly stable, some irritability   Panic attacks: yes  Energy: stable  Concentration: chronically low  Insomnia: stable  AIMS if on antipsychotic: no abnl movements  Eating/Weight: 239, 229, 224 lbs  Refills: y  Substances: 1 ppd, drinks 1x/mo, no cannabis but using OTC delta9 vape  Therapy: n  Medication compliant: y  SE: n  No SI HI AVH.      12/30/2024:   In person interview:  \"I've been ok, but I haven't been taking the medication.\"  Dad just had brain surgery, now in rehab. Had brain cancer.  I'm back to where I was.  Ruminating  Mom noticed I'm not on it. More negative, maybe more irritable  Bouts of depression are worse  Not really using any substances  MDD: mild depressed mood  EVE: agoraphobia, irritable  Panic attacks: yes  Energy: improving  Concentration: chronically low  Insomnia: stable  AIMS if on antipsychotic: no abnl movements  Eating/Weight: 229, 224 lbs  Refills: y  Substances: 1 ppd, drinks 1x/mo, no cannabis but using OTC delta9 vape  Therapy: n  Medication compliant: y  SE: n  No SI HI AVH.      11/08/2024:   In person interview:  \"Good.\"  Plateaued. People around me say I don't have mood swings.  I still have bouts of depression  Plans on going back to work soon  Anxiety and depression both improved  MDD: mild depressed mood  EVE: agoraphobia  Panic attacks: yes  Energy: improving  Concentration: chronically low  Insomnia: stable  AIMS if on antipsychotic:   Eating/Weight: 224 lbs  Refills: y  Substances: 1 ppd, drinks 1x/mo, no cannabis but " "using OTC delta9 vape  Therapy: n  Medication compliant: y  SE: n  No SI HI AVH.    ...    09/30/2024: INITIAL VISIT Chart review:     Ottoniel: blank  Care Everywhere: a few non behavioral health notes    Psychotropic medication chart review:  Present:  Lexapro 20 mg a day  Trazodone 100 mg at night  Rexulti    Previously:  09/30/2024: R/O ADHD, bipolar. Improving MDD, EVE on rexulti. Has been on adderall before, but he avoids it due to substance use hx. 5w    EKG: none  Procedures: none  Head imaging: none  Labs: none  Initial Chart Review Notes: Referred this month for depression.  Patient has a power of  in her father, Fercho.  Not much data.  Patient has missed or not responded to several calls made to set up follow-up.  These calls occurred from September 17 through September 20.    09/30/2024:   In person Interview:  His/Her Story: \"Sometimes I have trouble taking care of myself.\"  Escitalopram is good to help depression.  Rexulti has been helping, it has been 3 weeks  Dreams are vivid though  Reluctant to go to sleep  Reminiscenses begin, like embarrassing memories  Rexulti helping with that.  P16, G10  Sleeping? improving  Eating? stable  Energy? Improving possibly  AIMS: no abnormal movements  Depression/Mood: improving  Depressed mood improving  poor energy improving  Insomnia improving.  Seasonal pattern: def  Severity: moderate, but improving  Duration: On and off for years  Anxiety:  Uncontrolled worrying, feeling on edge or restless, irritability,   insomnia.  Severity: mild  Duration: On and off for years  Panic attacks: stable  AIMS if on antipsychotic: no abnormal movements  Psych ROS: Positive for depression, anxiety.  Negative for psychosis   Diya is unclear.  ADHD: def  PTSD: def  No SI HI AVH.  Medication compliant: y    Access to Firearms: denies    PHQ-9 Depression Screening  PHQ-9 Total Score:      Little interest or pleasure in doing things?     Feeling down, depressed, or hopeless? "     Trouble falling or staying asleep, or sleeping too much?     Feeling tired or having little energy?     Poor appetite or overeating?     Feeling bad about yourself - or that you are a failure or have let yourself or your family down?     Trouble concentrating on things, such as reading the newspaper or watching television?     Moving or speaking so slowly that other people could have noticed? Or the opposite - being so fidgety or restless that you have been moving around a lot more than usual?     Thoughts that you would be better off dead, or of hurting yourself in some way?     PHQ-9 Total Score       EVE-7       Past Surgical History:  Past Surgical History:   Procedure Laterality Date    TONSILLECTOMY         Problem List:  There is no problem list on file for this patient.      Allergy:   No Known Allergies     Discontinued Medications:  There are no discontinued medications.            Current Medications:   Current Outpatient Medications   Medication Sig Dispense Refill    atorvastatin (LIPITOR) 10 MG tablet       Brexpiprazole (Rexulti) 1 MG tablet Take 1.5 tablets by mouth Daily. 45 tablet 5    Cholecalciferol (VITAMIN D-3 PO) Take  by mouth.      escitalopram (Lexapro) 20 MG tablet Take 1 tablet by mouth Daily. 90 tablet 3    ferrous sulfate 325 (65 Fe) MG tablet       hydrOXYzine (ATARAX) 10 MG tablet Take 1 tablet by mouth 3 (Three) Times a Day As Needed for Itching. 180 tablet 1    omega-3 acid ethyl esters (LOVAZA) 1 g capsule Every 12 (Twelve) Hours.      omeprazole (priLOSEC) 40 MG capsule       rOPINIRole (REQUIP) 0.5 MG tablet Every 8 (Eight) Hours.      traZODone (DESYREL) 100 MG tablet Take 1 tablet by mouth Every Night. 90 tablet 1    vitamin D3 125 MCG (5000 UT) capsule capsule 1 capsule Daily.      dicyclomine (BENTYL) 10 MG capsule  (Patient not taking: Reported on 5/13/2025)      Omega-3 Fatty Acids (fish oil) 1000 MG capsule capsule Fish Oil 1,000 mg (120 mg-180 mg) oral capsule take 1  "capsule by oral route daily   Active (Patient not taking: Reported on 5/13/2025)       No current facility-administered medications for this visit.       Past Medical History:  Past Medical History:   Diagnosis Date    Anxiety     Depression        Past Psychiatric History:  Began Treatment: 12 - 13 years ago    Depression began at 11 yo  17 yo went to a Sabianist therapist for 3x    Seeing [facility] since about 18 yo    Diagnoses: MDD, EVE  Psychiatrist:Denies  Therapist: yes in the past  Admission History:Denies  Medication Trials:    Celexa at 17 yo didn't work at all      Self Harm: Denies  Suicide Attempts:Denies      Substance Abuse History:   Types: 1 ppd, hx of cannabis, 20 - 25 yo was \"an alcoholic\", was on meth for 2 years, sober since 7/26/20  Withdrawal Symptoms:Denies  Longest Period Sober:Not Applicable   AA: none    Social History:  Martial Status:Single  Employed:No  Kids:No  House:Lives in a house   History: Denies    Social History     Socioeconomic History    Marital status: Single   Tobacco Use    Smoking status: Every Day     Current packs/day: 0.50     Average packs/day: 0.7 packs/day for 16.5 years (12.0 ttl pk-yrs)     Types: Cigarettes     Passive exposure: Current    Smokeless tobacco: Former   Vaping Use    Vaping status: Every Day    Substances: Nicotine   Substance and Sexual Activity    Alcohol use: Yes    Drug use: Yes     Types: Marijuana     Comment: occ MARIJUANA    Sexual activity: Not Currently     Partners: Male       Family History:   Suicide Attempts: Denies  Suicide Completions:Denies      Family History   Problem Relation Age of Onset    Depression Mother     OCD Mother     Depression Brother        Developmental History:       Childhood: Denies Abuse, raised Sabianist  High School:Completed  College: some, one semester    Mental Status Exam  Appearance  : groomed, good eye contact, normal street clothes  Behavior  : pleasant and cooperative  Motor  : No " "abnormal  Speech  :normal rhythm, rate, volume, tone, not hyperverbal, not pressured, normal prosidy  Mood  : \"I cried the other day\"  Affect  : euthymic, mood incongruent, good variability  Thought Content  : negative suicidal ideations, negative homicidal ideations, negative obsessions  Perceptions  : negative auditory hallucinations, negative visual hallucinations  Thought Process  : linear  Insight/Judgement  : Fair/fair  Cognition  : grossly intact  Attention   : intact      Review of Systems:  Review of Systems   Constitutional:  Negative for diaphoresis and fatigue.   HENT:  Negative for drooling.    Eyes:  Negative for visual disturbance.   Respiratory:  Positive for cough. Negative for chest tightness and shortness of breath.    Cardiovascular:  Negative for chest pain, palpitations and leg swelling.   Gastrointestinal:  Negative for abdominal pain, diarrhea, nausea and vomiting.   Endocrine: Negative for cold intolerance and heat intolerance.   Genitourinary:  Negative for difficulty urinating.   Musculoskeletal:  Negative for joint swelling.   Allergic/Immunologic: Negative for immunocompromised state.   Neurological:  Negative for dizziness, seizures, speech difficulty, light-headedness, numbness and headaches.   Psychiatric/Behavioral:  Positive for sleep disturbance. Negative for behavioral problems and confusion.        Physical Exam:  Physical Exam    Vital Signs:   /73   Pulse 85   Ht 185.4 cm (73\")   Wt 109 kg (240 lb 12.8 oz)   BMI 31.77 kg/m²      Lab Results:   No visits with results within 6 Month(s) from this visit.   Latest known visit with results is:   Admission on 08/20/2021, Discharged on 08/20/2021   Component Date Value Ref Range Status    SARS Antigen 08/20/2021 Not Detected  Not Detected Final    Internal Control 08/20/2021 Passed  Passed Final    Lot Number 08/20/2021 [id number]   Final    Expiration Date 08/20/2021 10,523   Final       EKG Results:  No orders to display "       Imaging Results:  No Images in the past 120 days found..      Assessment & Plan   Diagnoses and all orders for this visit:    1. Major depressive disorder, recurrent episode, moderate (Primary)    2. Generalized anxiety disorder    3. Insomnia due to mental condition    4. Panic attacks    5. ADHD (attention deficit hyperactivity disorder), inattentive type  -     Urine Drug Screen - Urine, Clean Catch; Future        Visit Diagnoses:    ICD-10-CM ICD-9-CM   1. Major depressive disorder, recurrent episode, moderate  F33.1 296.32   2. Generalized anxiety disorder  F41.1 300.02   3. Insomnia due to mental condition  F51.05 300.9     327.02   4. Panic attacks  F41.0 300.01   5. ADHD (attention deficit hyperactivity disorder), inattentive type  F90.0 314.00     05/13/2025: UDS, CSA, start vyvanse for ADHD. Denies cardiac, seizure hx.    Acknowledged and normalized patient's thoughts, feelings, and concerns. Allowed patient to freely discuss and process issues, such as:  Anxiety at work.  Anxiety and depression regarding relationships.  Anxiety and depression regarding family's well-being.  ... using Rogerian psychotherapeutic techniques including unconditional positive regard, reflective listening, and demonstrating clear empathy, with the goal of ameliorating symptoms and maintaining, restoring, or improving self-esteem, adaptive skills, and ego or psychological functions (Fox et al. 1991), the long-term goal of which is to develop a better, healthier perspective and help the patient bear their circumstances more easily.  Time (minutes) spent providing supportive psychotherapy: 16  (This time is exclusive to the therapy session and separate from the time spent on activities used to meet the criteria for the E/M service (history, exam, medical decision-making).)  Start: 1:16  Stop: 1:32  Functional status: mild impairment  Treatment plan: Medication management and supportive psychotherapy  Prognosis:  "good  Progress: MDD, EVE -- improving  6w    03/25/2025: Improved, stable, no changes. Discussed father.    02/11/2025: Nathan to discuss \"spirituality conflict,\" possible family conflict. Never tested for ADHD. Consider clonidine, lamictal.    12/30/2024: MDD, EVE, restart rexulti (re-sent in).    11/08/2024: Improving, increase rexulti for MDD, EVE.    09/30/2024: R/O ADHD, bipolar. Improving MDD, EVE on rexulti. Has been on adderall before, but he avoids it due to substance use hx. 5w    PLAN:  Safety: No acute safety concerns  Therapy: None, maybe later  Risk Assessment: Risk of self-harm acutely is moderate.  Risk factors include anxiety disorder, mood disorder, AODA hx, and recent psychosocial stressors (pandemic). Protective factors include no family history, denies access to guns/weapons, no present SI, no history of suicide attempts or self-harm in the past, healthcare seeking, future orientation, willingness to engage in care.  Risk of self-harm chronically is also moderate, but could be further elevated in the event of treatment noncompliance and/or AODA.  Meds:  START vyvanse 30 mg qam. Risks, benefits, side effects discussed with patient including elevated heart rate, elevated blood pressure, irritability, insomnia, sexual dysfunction, appetite suppressing properties, psychosis, stroke, myocardial infarction, seizure.  After discussion of these risks and benefits, the patient voiced understanding and agreed to proceed. Ottoniel reviewed, UDS ordered, and controlled substance agreement signed.  CONTINUE rexulti 1.5 mg qhs. Risks, benefits, alternatives discussed with patient including increased energy, exacerbation of irritability, weight gain, akathisia, GI upset, tardive dyskinesia/dystonia, movement issues, extrapyramidal symptoms, orthostatic hypotension.  Use care when operating vehicle, vessel, or machine. After discussion of these risks and benefits, the patient voiced understanding and agreed to " proceed.  CONTINUE lexapro 20 mg qday. Risks, benefits, alternatives discussed with patient including GI upset, nausea vomiting diarrhea, hyponatremia, theoretical decrease of seizure threshold predisposing the patient to a slightly higher seizure risk, headaches, sexual dysfunction, serotonin syndrome, bleeding risk, increased suicidality in patients 24 years and younger, switching to tenzin/hypomania.  After discussion of these risks and benefits, the patient voiced understanding and agreed to proceed.  CONTINUE hydroxyzine 10 mg BID. Risks, benefits, alternatives discussed with patient including sedation, dizziness, fall risk, GI upset, and risk of increased CNS depression and elevated heart rate if taken with other antihistamines.  Use care when operating vehicle, vessel, or machine. After discussion of these risks and benefits, the patient voiced understanding and agreed to proceed.  CONTINUE trazodone 100 mg qhs. Risks, benefits, side effects discussed with patient including GI upset, sedation, dizziness/falls risk, grogginess the following day, prolongation of the QTc interval.  Do not use before operating vehicle, vessel, or machine. After discussion of these risks and benefits, the patient voiced understanding and agreed to proceed.    Labs: none    Patient screened positive for depression based on a PHQ-9 score of 9 on 5/5/2025. Follow-up recommendations include: Prescribed antidepressant medication treatment and Suicide Risk Assessment performed.           TREATMENT PLAN/GOALS: Continue supportive psychotherapy efforts and medications as indicated. Treatment and medication options discussed during today's visit. Patient acknowledged and verbally consented to continue with current treatment plan and was educated on the importance of compliance with treatment and follow-up appointments.    MEDICATION ISSUES:  JASON reviewed as expected.  Discussed medication options and treatment plan of prescribed  medication as well as the risks, benefits, and side effects including potential falls, possible impaired driving and metabolic adversities among others. Patient is agreeable to call the office with any worsening of symptoms or onset of side effects. Patient is agreeable to call 911 or go to the nearest ER should he/she begin having SI/HI. No medication side effects or related complaints today.     MEDS ORDERED DURING VISIT:  No orders of the defined types were placed in this encounter.      Return in about 6 weeks (around 6/24/2025).         This document has been electronically signed by Hiram Fitch MD  May 13, 2025 13:36 EDT    Dictated Utilizing Dragon Dictation: Part of this note may be an electronic transcription/translation of spoken language to printed text using the Dragon Dictation System.

## 2025-05-14 ENCOUNTER — TELEPHONE (OUTPATIENT)
Dept: PSYCHIATRY | Facility: CLINIC | Age: 30
End: 2025-05-14
Payer: COMMERCIAL

## 2025-05-19 NOTE — TELEPHONE ENCOUNTER
CALLED PT'S PHARMACY AND HAD THEM RUN THE VYVANSE PRESCRIPTION.  IT WENT THROUGH.  I CALLED THE PATIENT AND NOTIFIED PT

## 2025-05-22 ENCOUNTER — TELEMEDICINE (OUTPATIENT)
Dept: PSYCHIATRY | Facility: CLINIC | Age: 30
End: 2025-05-22
Payer: COMMERCIAL

## 2025-05-22 DIAGNOSIS — F41.1 GENERALIZED ANXIETY DISORDER: Primary | ICD-10-CM

## 2025-05-22 DIAGNOSIS — F33.1 MODERATE EPISODE OF RECURRENT MAJOR DEPRESSIVE DISORDER: ICD-10-CM

## 2025-05-22 NOTE — PLAN OF CARE
Refer to care plan    Multi-Disciplinary Problems (from Behavioral Health Treatment Plan)      Active Problems       Problem: Anxiety  Start Date: 05/22/25      Problem Details: The patient self-scales this problem as a 8 with 10 being the worst; patient experiences over thinking and constant worry about various things that interrupt his daily routine.          Goal Priority Start Date Expected End Date End Date    Patient will develop and implement behavioral and cognitive strategies to reduce anxiety and irrational fears. -- 05/22/25 11/20/25 --    Goal Details: Progress toward goal:  Not appropriate to rate progress toward goal since this is the initial treatment plan.        Goal Intervention Frequency Start Date End Date    Help patient explore past emotional issues in relation to present anxiety. Q2 Weeks 05/22/25 --    Intervention Details: Duration of treatment until discharged.        Goal Intervention Frequency Start Date End Date    Help patient develop an awareness of their cognitive and physical responses to anxiety. Q2 Weeks 05/22/25 --    Intervention Details: Duration of treatment until discharged.                Problem: Depression  Start Date: 05/22/25      Problem Details: The patient self-scales this problem as a 8 with 10 being the worst as he at times has struggled to get out of bed and struggled with motivation to do anything.          Goal Priority Start Date Expected End Date End Date    Patient will demonstrate the ability to initiate new constructive life skills outside of sessions on a consistent basis. -- 05/22/25 11/20/25 --    Goal Details: Progress toward goal:  Not appropriate to rate progress toward goal since this is the initial treatment plan.        Goal Intervention Frequency Start Date End Date    Assist patient in setting attainable activities of daily living goals. Q2 Weeks 05/22/25 --      Goal Intervention Frequency Start Date End Date    Provide education about depression  Q2 Weeks 05/22/25 --    Intervention Details: Duration of treatment until discharged.        Goal Intervention Frequency Start Date End Date    Assist patient in developing healthy coping strategies. Q2 Weeks 05/22/25 --    Intervention Details: Duration of treatment until discharged.                Problem: Relationship Issues  Start Date: 05/22/25      Problem Details: The patient self-scales this problem as a 5 with 10 being the worst; patient states that most of his friends are older and he has struggled to fit in with people of his own age.          Goal Priority Start Date Expected End Date End Date    Patient will initiate personal change to improve relationships. -- 05/22/25 11/20/25 --    Goal Details: Progress toward goal:  Not appropriate to rate progress toward goal since this is the initial treatment plan.        Goal Intervention Frequency Start Date End Date    Assist patient in identifying behaviors that focus on relationship building and process the changes needed to improve relationships. Q2 Weeks 05/22/25 --    Intervention Details: Duration of treatment until discharged.                Problem: Vocational Stress  Start Date: 05/22/25      Problem Details: The patient self-scales this problem as a 10 with 10 being the worst; patient states that he is currently living off of his savings and           Goal Priority Start Date Expected End Date End Date    Patient will develop a constructive action plan that will reduce specific areas of work stress. -- 05/22/25 11/20/25 --    Goal Details: Progress toward goal:  Not appropriate to rate progress toward goal since this is the initial treatment plan.        Goal Intervention Frequency Start Date End Date    Assist patient in identifying emotions and cognitive messages surrounding the work stress. Q2 Weeks 05/22/25 --    Intervention Details: Duration of treatment until discharged.        Goal Intervention Frequency Start Date End Date    Reinforce  realistic self appraisal of patient's successes and challenges at work. Q2 Weeks 05/22/25 --    Intervention Details: Duration of treatment until discharged.                Problem: ADHD (Adult)  Start Date: 05/22/25      Problem Details: The patient self-scales this problem as a 8 with 10 being the worst.        Goal Priority Start Date Expected End Date End Date    Patient will sustain attention and concentration to complete chores, and work responsibilites and increase positive interaction in all relationships. -- 05/22/25 11/20/25 --    Goal Details: Progress toward goal:  Not appropriate to rate progress toward goal since this is the initial treatment plan.        Goal Intervention Frequency Start Date End Date    Assist patient in setting responsible goals and breaking down large tasks. Q2 Weeks 05/22/25 --    Intervention Details: Duration of treatment until discharged.        Goal Intervention Frequency Start Date End Date    Assist patient in using self monitoring checklist to improve attention, work performance, and social skills. Q2 Weeks 05/22/25 --    Intervention Details: Duration of treatment until discharged.                        Reviewed By       Serenity Mendoza, [facility] 05/22/25 [id number]

## 2025-05-22 NOTE — TREATMENT PLAN
Multi-Disciplinary Problems (from Behavioral Health Treatment Plan)      Active Problems       Problem: Anxiety  Start Date: 05/22/25      Problem Details: The patient self-scales this problem as a 8 with 10 being the worst; patient experiences over thinking and constant worry about various things that interrupt his daily routine.          Goal Priority Start Date Expected End Date End Date    Patient will develop and implement behavioral and cognitive strategies to reduce anxiety and irrational fears. -- 05/22/25 11/20/25 --    Goal Details: Progress toward goal:  Not appropriate to rate progress toward goal since this is the initial treatment plan.        Goal Intervention Frequency Start Date End Date    Help patient explore past emotional issues in relation to present anxiety. Q2 Weeks 05/22/25 --    Intervention Details: Duration of treatment until discharged.        Goal Intervention Frequency Start Date End Date    Help patient develop an awareness of their cognitive and physical responses to anxiety. Q2 Weeks 05/22/25 --    Intervention Details: Duration of treatment until discharged.                Problem: Depression  Start Date: 05/22/25      Problem Details: The patient self-scales this problem as a 8 with 10 being the worst as he at times has struggled to get out of bed and struggled with motivation to do anything.          Goal Priority Start Date Expected End Date End Date    Patient will demonstrate the ability to initiate new constructive life skills outside of sessions on a consistent basis. -- 05/22/25 11/20/25 --    Goal Details: Progress toward goal:  Not appropriate to rate progress toward goal since this is the initial treatment plan.        Goal Intervention Frequency Start Date End Date    Assist patient in setting attainable activities of daily living goals. Q2 Weeks 05/22/25 --      Goal Intervention Frequency Start Date End Date    Provide education about depression Q2 Weeks 05/22/25 --     Intervention Details: Duration of treatment until discharged.        Goal Intervention Frequency Start Date End Date    Assist patient in developing healthy coping strategies. Q2 Weeks 05/22/25 --    Intervention Details: Duration of treatment until discharged.                Problem: Relationship Issues  Start Date: 05/22/25      Problem Details: The patient self-scales this problem as a 5 with 10 being the worst; patient states that most of his friends are older and he has struggled to fit in with people of his own age.          Goal Priority Start Date Expected End Date End Date    Patient will initiate personal change to improve relationships. -- 05/22/25 11/20/25 --    Goal Details: Progress toward goal:  Not appropriate to rate progress toward goal since this is the initial treatment plan.        Goal Intervention Frequency Start Date End Date    Assist patient in identifying behaviors that focus on relationship building and process the changes needed to improve relationships. Q2 Weeks 05/22/25 --    Intervention Details: Duration of treatment until discharged.                Problem: Vocational Stress  Start Date: 05/22/25      Problem Details: The patient self-scales this problem as a 10 with 10 being the worst; patient states that he is currently living off of his savings and           Goal Priority Start Date Expected End Date End Date    Patient will develop a constructive action plan that will reduce specific areas of work stress. -- 05/22/25 11/20/25 --    Goal Details: Progress toward goal:  Not appropriate to rate progress toward goal since this is the initial treatment plan.        Goal Intervention Frequency Start Date End Date    Assist patient in identifying emotions and cognitive messages surrounding the work stress. Q2 Weeks 05/22/25 --    Intervention Details: Duration of treatment until discharged.        Goal Intervention Frequency Start Date End Date    Reinforce realistic self appraisal  of patient's successes and challenges at work. Q2 Weeks 05/22/25 --    Intervention Details: Duration of treatment until discharged.                Problem: ADHD (Adult)  Start Date: 05/22/25      Problem Details: The patient self-scales this problem as a 8 with 10 being the worst.        Goal Priority Start Date Expected End Date End Date    Patient will sustain attention and concentration to complete chores, and work responsibilites and increase positive interaction in all relationships. -- 05/22/25 11/20/25 --    Goal Details: Progress toward goal:  Not appropriate to rate progress toward goal since this is the initial treatment plan.        Goal Intervention Frequency Start Date End Date    Assist patient in setting responsible goals and breaking down large tasks. Q2 Weeks 05/22/25 --    Intervention Details: Duration of treatment until discharged.        Goal Intervention Frequency Start Date End Date    Assist patient in using self monitoring checklist to improve attention, work performance, and social skills. Q2 Weeks 05/22/25 --    Intervention Details: Duration of treatment until discharged.                        Reviewed By       Serenity Mendoza [facility] 05/22/25 [id number]                     I have discussed and reviewed this treatment plan with the patient.

## 2025-06-08 PROBLEM — F33.1 MODERATE EPISODE OF RECURRENT MAJOR DEPRESSIVE DISORDER: Status: ACTIVE | Noted: 2025-06-08

## 2025-06-08 PROBLEM — F41.1 GENERALIZED ANXIETY DISORDER: Status: ACTIVE | Noted: 2025-06-08

## 2025-06-11 ENCOUNTER — TELEMEDICINE (OUTPATIENT)
Dept: PSYCHIATRY | Facility: CLINIC | Age: 30
End: 2025-06-11
Payer: COMMERCIAL

## 2025-06-11 DIAGNOSIS — F33.1 MODERATE EPISODE OF RECURRENT MAJOR DEPRESSIVE DISORDER: ICD-10-CM

## 2025-06-11 DIAGNOSIS — F41.1 GENERALIZED ANXIETY DISORDER: Primary | ICD-10-CM

## 2025-06-11 NOTE — PROGRESS NOTES
Date: June 27, 2025  Time In: 9:13 AM   Time Out: 10:13 AM  This provider is located at [facility]), [address], [place] [address] using a secure [employer] Video Visit through [employer]. Patient is being seen remotely via telehealth at home address in [place] and stated they are in a secure environment for this session. The patient's condition being diagnosed/treated is appropriate for telemedicine. The provider identified herself as well as her credentials. The patient, and/or patients guardian, consent to be seen remotely, and when consent is given they understand that the consent allows for patient identifiable information to be sent to a third party as needed. They may refuse to be seen remotely at any time. The electronic data is encrypted and password protected, and the patient and/or guardian has been advised of the potential risks to privacy not withstanding such measures.   You have chosen to receive care through a telehealth visit.  Do you consent to use a video/audio connection for your medical care today? Yes  Mode of Visit: Video  Location of patient: -HOME-  Location of provider: +HOME+  You have chosen to receive care through a telehealth visit.  The patient has signed the video visit consent form.  The visit included audio and video interaction.   PROGRESS NOTE  Data:  Reji Argueta is a 29 y.o. male who presents today for follow up. Patient states that things have been going good for him recently and has seen improvement with the ADHD medication but states that it is not lasting as long as he would like. Patient reported that he has been taking naps a couple times per week; this used to be several times per day. Discussed the patient's daily routine of getting up and doing daily household chores and fixing meals for his parents.  Patient states that he also takes his parents to their appointments and his mother is preparing  for surgery next Tuesday and will have a 6 week recovery time. Patient states that his father will have to have chemotherapy on Monday but states that his father isn't eating or drinking much and the patient is concerned that his father is dehydrated.  Patient states that he was out of town last week to see a friend in [place] and help him with some errands before his mother has surgery and his father missed a couple of appointments because he had forgotten to tell the patient about them. Patient states that he does worry about his father trying to get out of bed at night as he fell almost two weeks ago and hit his head and suffered two black eyes.  Patient states that he and his mother have been worried about his father's safety and are doing all they can to ensure that he is as safe as possible but patient states that sometimes he does not hear his father when he gets up or his father will insist that he can do things for himself. Per patient, his siblings don't call or come by as much as he would think that they should and states that it bothers his mother somewhat that she doesn't get to see her other children or grandchildren but he states that he tries to not say anything to his brothers as he knows that they have things going on in their lives as well.  Patient states that it would be nice to have additional support but realizes that there is not much that he can do by way of making his brothers do anything.    Chief Complaint: concerns for parents health needs, straying thoughts during the day, decreased ruminations at night, decrease in feelings of boredom    History of Present Illness: patient has struggled with anxiety and depression for quite some time now.     Clinical Maneuvering/Intervention: solution focused    (Scales based on 0 - 10 with 10 being the worst)  Depression: 4 Anxiety: 4       Assisted patient in processing above session content; acknowledged and normalized patient’s thoughts, feelings,  and concerns.  Rationalized patient thought process regarding the health concerns of his parents.  Discussed triggers associated with patient's feelings of anxiety and depression as they apply to concerns for his parents and friends and trying to manage his daily schedule.  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     Allowed patient to freely discuss issues without interruption or judgment. Provided safe, confidential environment to facilitate the development of positive therapeutic relationship and encourage open, honest communication. Assisted patient in identifying risk factors which would indicate the need for higher level of care including thoughts to harm self or others and/or self-harming behavior and encouraged patient to contact this office, call 911, or present to the nearest emergency room should any of these events occur. Discussed crisis intervention services and means to access. Patient adamantly and convincingly denies current suicidal or homicidal ideation or perceptual disturbance.    Assessment:   Assessment   Patient appears to maintain relative stability as compared to their baseline.  However, patient continues to struggle with anxiety and depression which continues to cause impairment in important areas of functioning.  A result, they can be reasonably expected to continue to benefit from treatment and would likely be at increased risk for decompensation otherwise.    Mental Status Exam:   Hygiene:   good  Cooperation:  Cooperative  Eye Contact:  Good  Psychomotor Behavior:  Appropriate  Affect:  Appropriate  Mood: normal and happy  Speech:  Normal  Thought  Process:  Goal directed  Thought Content:  Normal  Suicidal:  None  Homicidal:  None  Hallucinations:  None  Delusion:  None  Memory:  Intact  Orientation:  Person, Place, Time, and Situation  Reliability:  good  Insight:  Good  Judgement:  Good  Impulse Control:  Good  Physical/Medical Issues:  No        Patient's Support Network Includes:  parents, extended family, and friends    Functional Status: Moderate impairment     Progress toward goal: Not at goal    Prognosis: Good with Ongoing Treatment          Plan:    Patient will continue in individual outpatient therapy with focus on improved functioning and coping skills, maintaining stability, and avoiding decompensation and the need for higher level of care.    Patient will adhere to medication regimen as prescribed and report any side effects. Patient will contact this office, call 911 or present to the nearest emergency room should suicidal or homicidal ideations occur. Provide Cognitive Behavioral Therapy and Solution Focused Therapy to improve functioning, maintain stability, and avoid decompensation and the need for higher level of care.     Return in about 3 weeks, or earlier if symptoms worsen or fail to improve.           VISIT DIAGNOSIS:     ICD-10-CM ICD-9-CM   1. Generalized anxiety disorder  F41.1 300.02   2. Moderate episode of recurrent major depressive disorder  F33.1 296.32             This document has been electronically signed by JUVENTINO Mckeon  June 27, 2025 11:09 [facility] No Show Policy:  We understand unexpected circumstances arise; however, anytime you miss your appointment we are unable to provide you appropriate care.  In addition, each appointment missed could have been used to provide care for others.  We ask that you call at least 24 hours in advance to cancel or reschedule an appointment.  We would like to take this opportunity to remind you of our policy stating patients who miss THREE or more  appointments without cancelling or rescheduling 24 hours in advance of the appointment may be subject to cancellation of any further visits with our clinic and recommendation to seek in-person services/visits.    Please call [phone] to reschedule your appointment. If there are reasons that make it difficult for you to keep the appointments, please call and let us know how we can help.  Please understand that medication prescribing will not continue without seeing your provider.      [facility]'s No Show Policy reviewed with patient at today's visit. Patient verbalized understanding of this policy. Discussed with patient that in the event that there are three or more no show visits, it will be recommended that they pursue in-person services/visits as noncompliance with telehealth visits indicates that patient is not an appropriate candidate for telemedicine and would likely be more appropriate for in-person services/visits. Patient verbalizes understanding and is agreeable to this.    BEHAVIORAL HEALTH RESOURCE CONNECTION      If you or a family member are not sure where to start, calling   the [facility]al [facility] Resource Connection is   a great place to begin. The resource line is dedicated to   providing behavioral health resources to residents of [place]   and [place], seven days a week, 7 a.m.-7 p.m.    Behavioral Health Resource Connection  [phone]      Part of this note may be an electronic transcription/translation of spoken language to printed text using the Dragon Dictation System.

## 2025-06-13 DIAGNOSIS — F90.0 ADHD (ATTENTION DEFICIT HYPERACTIVITY DISORDER), INATTENTIVE TYPE: ICD-10-CM

## 2025-06-13 RX ORDER — LISDEXAMFETAMINE DIMESYLATE 30 MG/1
30 CAPSULE ORAL EVERY MORNING
Qty: 30 CAPSULE | Refills: 0 | Status: SHIPPED | OUTPATIENT
Start: 2025-06-13

## 2025-06-13 NOTE — TELEPHONE ENCOUNTER
REFILL REQUEST:    lisdexamfetamine (Vyvanse) 30 MG capsule (05/13/2025)     F/UP: 07/01/2025.  LOV: 05/13/2025.    LAST UDS:  Urine Drug Screen - Urine, Clean Catch (05/13/2025 16:08)

## 2025-07-01 ENCOUNTER — OFFICE VISIT (OUTPATIENT)
Dept: PSYCHIATRY | Facility: CLINIC | Age: 30
End: 2025-07-01
Payer: COMMERCIAL

## 2025-07-01 VITALS
SYSTOLIC BLOOD PRESSURE: 115 MMHG | DIASTOLIC BLOOD PRESSURE: 62 MMHG | WEIGHT: 232.8 LBS | HEART RATE: 91 BPM | BODY MASS INDEX: 31.53 KG/M2 | HEIGHT: 72 IN

## 2025-07-01 DIAGNOSIS — F33.1 MAJOR DEPRESSIVE DISORDER, RECURRENT EPISODE, MODERATE: ICD-10-CM

## 2025-07-01 DIAGNOSIS — F90.0 ADHD (ATTENTION DEFICIT HYPERACTIVITY DISORDER), INATTENTIVE TYPE: Primary | ICD-10-CM

## 2025-07-01 DIAGNOSIS — F51.05 INSOMNIA DUE TO MENTAL CONDITION: ICD-10-CM

## 2025-07-01 DIAGNOSIS — F41.1 GENERALIZED ANXIETY DISORDER: ICD-10-CM

## 2025-07-01 DIAGNOSIS — F41.0 PANIC ATTACKS: ICD-10-CM

## 2025-07-01 RX ORDER — BREXPIPRAZOLE 1 MG/1
1.5 TABLET ORAL EVERY 24 HOURS
Qty: 45 TABLET | Refills: 5 | Status: SHIPPED | OUTPATIENT
Start: 2025-07-01

## 2025-07-01 NOTE — TREATMENT PLAN
Anxiety:  6/10 progressing    Goals:  Patient will develop and implement behavioral and cognitive strategies to reduce anxiety and irrational fears, monthly, using Rogerian psychotherapy and CBT where appropriate.  Help patient explore past emotional issues in relation to present anxiety, monthly, until remission of symptoms, using Rogerian psychotherapy and CBT where appropriate.  Help patient develop an awareness of their cognitive and physical responses to anxiety, monthly, until remission of symptoms, using Rogerian psychotherapy and CBT where appropriate.          Depression:  5/10 progressing    Goals:  Patient will demonstrate the ability to initiate new constructive life skills outside of sessions on a consistent basis, monthly, using Rogerian psychotherapy and CBT where appropriate.  Assist patient in setting attainable activities of daily living goals, monthly, using Rogerian psychotherapy and CBT where appropriate.  Provide education about depression, monthly, using Rogerian psychotherapy and CBT where appropriate.  Assist patient in developing healthy coping strategies, monthly, using Rogerian psychotherapy and CBT where appropriate.    Rogerian psychotherapy and CBT will be used to help accomplish the above goals and manage depression and anxiety related to relationships, family conflict, work stresses       I have discussed and reviewed this treatment plan with the patient.      Reviewed by Hiram Fitch MD   07/01/2025  .

## 2025-07-01 NOTE — PROGRESS NOTES
"Subjective   Reji Argueta is a 29 y.o. male who presents today for initial evaluation     Referring Provider:  No referring provider defined for this encounter.    Chief Complaint: Anxiety    History of Present Illness:    Chart Review By Dates:  07/01/2025: Reji x2, unknown; UDS pos for THC.  05/13/2025: Reji x1.  03/25/2025: no visits.  02/11/2025: no visits.  12/30/2024: no visits.  11/8/24: no visits.    VISITS/APPOINTMENTS (BELOW):    \"Reji\"  \"Nervous laugh\"  Has been on adderall before, but avoids 2/2 substance use hx  R/o bipolar    07/01/2025:   In person interview:  \"Good, less stray thoughts.\"  Less distracted  Busy, Mom had hernia surgery  Dad having ups and downs with chemo  Enjoying being busy  Not taking hydroxyzine anymore  Stable vitals  ADHD: improving focus  MDD: stable, no mood instability  EVE: stable  Panic attacks: stable  Energy: improving  Concentration: chronically low  Insomnia: stable  AIMS if on antipsychotic: no abnl movements  Eating/Weight: 232, 240, 236, 239, 229, 224 lbs  Refills: y  Substances: vaping, not drinking, using OTC delta9 vape  Therapy: jocelin Mendoza  Medication compliant: y  SE: n  No SI HI AVH.      05/13/2025:   In person interview:  \"Good I guess... a little bit of a blur.\"  I was diagnosed with ADHD at 17 yo, 20 yo  I wasn't diagnosed as a kid  Brother has ADHD, on vyvanse  We think Mom has it  Adderall gave me a high, vyvanse didn't  I cried last week, \"pissed a couple people off\"  [place] lonesome  Let people down  ADHD: poor focus, distracted, talking over me, forgetting things, losing things  MDD: stable, no mood instability  EVE: fairly stable  Panic attacks: stable  Energy: stable  Concentration: chronically low  Insomnia: stable  AIMS if on antipsychotic: no abnl movements  Eating/Weight: 240, 236, 239, 229, 224 lbs  Refills: y  Substances: 1 ppd, rare alcohol, using OTC delta9 vape  Therapy: Reji 5/6/25  Medication compliant: y  SE: n  No SI HI " "AVH.      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  Will start targeted radiation on them soon  Dad's been meaner to Mom. The chemo has been affecting his brain.  Mom's having a rough time.  Dad's prognosis: chemo indefinitely  \"I think I'm doing pretty good with meds. I'm in a good spot.\"  Roommate is in [place], Dad's in [facility]  Rexulti keeps my mood under control  MDD: stable, no mood instability  EVE: fairly stable  Panic attacks: stable  Energy: stable  Concentration: chronically low  Insomnia: stable  AIMS if on antipsychotic: no abnl movements  Eating/Weight: 236, 239, 229, 224 lbs  Refills: y  Substances: 1 ppd, rare alcohol, using OTC delta9 vape  Therapy: Reji 5/6/25  Medication compliant: y  SE: n  No SI HI AVH.      02/11/2025:   In person interview:  \"I am on it now.\"  \"Overall I'm fine\"  Spending a lot of time in [place]  Can I get a therapist? My  recommends one  Some irritability at home with parents  Dad just finished radiation, may have other tumors in his body, starts chemo soon.  MDD: stable?  EVE: fairly stable, some irritability   Panic attacks: yes  Energy: stable  Concentration: chronically low  Insomnia: stable  AIMS if on antipsychotic: no abnl movements  Eating/Weight: 239, 229, 224 lbs  Refills: y  Substances: 1 ppd, drinks 1x/mo, no cannabis but using OTC delta9 vape  Therapy: n  Medication compliant: y  SE: n  No SI HI AVH.    ...    09/30/2024: INITIAL VISIT Chart review:     Ottoniel: blank  Care Everywhere: a few non behavioral health notes    Psychotropic medication chart review:  Present:  Lexapro 20 mg a day  Trazodone 100 mg at night  Rexulti    Previously:  09/30/2024: R/O ADHD, bipolar. Improving MDD, EVE on rexulti. Has been on adderall before, but he avoids it due to substance use hx. 5w    EKG: none  Procedures: none  Head imaging: none  Labs: none  Initial Chart Review Notes: " "Referred this month for depression.  Patient has a power of  in her father, Fercho.  Not much data.  Patient has missed or not responded to several calls made to set up follow-up.  These calls occurred from September 17 through September 20.    09/30/2024:   In person Interview:  His/Her Story: \"Sometimes I have trouble taking care of myself.\"  Escitalopram is good to help depression.  Rexulti has been helping, it has been 3 weeks  Dreams are vivid though  Reluctant to go to sleep  Reminiscenses begin, like embarrassing memories  Rexulti helping with that.  P16, G10  Sleeping? improving  Eating? stable  Energy? Improving possibly  AIMS: no abnormal movements  Depression/Mood: improving  Depressed mood improving  poor energy improving  Insomnia improving.  Seasonal pattern: def  Severity: moderate, but improving  Duration: On and off for years  Anxiety:  Uncontrolled worrying, feeling on edge or restless, irritability,   insomnia.  Severity: mild  Duration: On and off for years  Panic attacks: stable  AIMS if on antipsychotic: no abnormal movements  Psych ROS: Positive for depression, anxiety.  Negative for psychosis   Diya is unclear.  ADHD: def  PTSD: def  No SI HI AVH.  Medication compliant: y    Access to Firearms: denies    PHQ-9 Depression Screening  PHQ-9 Total Score:      Little interest or pleasure in doing things?     Feeling down, depressed, or hopeless?     Trouble falling or staying asleep, or sleeping too much?     Feeling tired or having little energy?     Poor appetite or overeating?     Feeling bad about yourself - or that you are a failure or have let yourself or your family down?     Trouble concentrating on things, such as reading the newspaper or watching television?     Moving or speaking so slowly that other people could have noticed? Or the opposite - being so fidgety or restless that you have been moving around a lot more than usual?     Thoughts that you would be better off dead, " or of hurting yourself in some way?     PHQ-9 Total Score       EVE-7       Past Surgical History:  Past Surgical History:   Procedure Laterality Date    TONSILLECTOMY         Problem List:  Patient Active Problem List   Diagnosis    Generalized anxiety disorder    Moderate episode of recurrent major depressive disorder       Allergy:   No Known Allergies     Discontinued Medications:  Medications Discontinued During This Encounter   Medication Reason    Brexpiprazole (Rexulti) 1 MG tablet Reorder               Current Medications:   Current Outpatient Medications   Medication Sig Dispense Refill    atorvastatin (LIPITOR) 10 MG tablet       Brexpiprazole (Rexulti) 1 MG tablet Take 1.5 tablets by mouth Daily. 45 tablet 5    Cholecalciferol (VITAMIN D-3 PO) Take  by mouth.      escitalopram (Lexapro) 20 MG tablet Take 1 tablet by mouth Daily. 90 tablet 3    ferrous sulfate 325 (65 Fe) MG tablet       hydrOXYzine (ATARAX) 10 MG tablet Take 1 tablet by mouth 3 (Three) Times a Day As Needed for Itching. 180 tablet 1    lisdexamfetamine (Vyvanse) 30 MG capsule Take 1 capsule by mouth Every Morning 30 capsule 0    omega-3 acid ethyl esters (LOVAZA) 1 g capsule Every 12 (Twelve) Hours.      omeprazole (priLOSEC) 40 MG capsule       rOPINIRole (REQUIP) 0.5 MG tablet Every 8 (Eight) Hours.      traZODone (DESYREL) 100 MG tablet Take 1 tablet by mouth Every Night. 90 tablet 1    vitamin D3 125 MCG (5000 UT) capsule capsule 1 capsule Daily.      dicyclomine (BENTYL) 10 MG capsule  (Patient not taking: Reported on 12/30/2024)      Omega-3 Fatty Acids (fish oil) 1000 MG capsule capsule Fish Oil 1,000 mg (120 mg-180 mg) oral capsule take 1 capsule by oral route daily   Active (Patient not taking: Reported on 2/11/2025)       No current facility-administered medications for this visit.       Past Medical History:  Past Medical History:   Diagnosis Date    ADHD (attention deficit hyperactivity disorder) 05/12/25    Diagnosed by   "[facility]    Anxiety     Depression        Past Psychiatric History:  Began Treatment: 12 - 13 years ago    Depression began at 11 yo  17 yo went to a [facility] therapist for 3x    Seeing Rosemary since about 16 yo    Diagnoses: MDD, EVE  Psychiatrist:Denies  Therapist: yes in the past  Admission History:Denies  Medication Trials:    Celexa at 17 yo didn't work at all      Self Harm: Denies  Suicide Attempts:Denies      Substance Abuse History:   Types: 1 ppd, hx of cannabis, 20 - 25 yo was \"an alcoholic\", was on meth for 2 years, sober since 7/26/20  Withdrawal Symptoms:Denies  Longest Period Sober:Not Applicable   AA: none    Social History:  Martial Status:Single  Employed:No  Kids:No  House:Lives in a house   History: Denies    Social History     Socioeconomic History    Marital status: Single   Tobacco Use    Smoking status: Every Day     Current packs/day: 0.50     Average packs/day: 0.7 packs/day for 16.5 years (12.0 ttl pk-yrs)     Types: Cigarettes     Passive exposure: Current    Smokeless tobacco: Former   Vaping Use    Vaping status: Every Day    Substances: Nicotine   Substance and Sexual Activity    Alcohol use: Yes    Drug use: Yes     Types: Marijuana     Comment: occ MARIJUANA    Sexual activity: Not Currently     Partners: Male       Family History:   Suicide Attempts: Denies  Suicide Completions:Denies      Family History   Problem Relation Age of Onset    Depression Mother     OCD Mother     Anxiety disorder Mother     Depression Brother     ADD / ADHD Brother        Developmental History:       Childhood: Denies Abuse, raised Jew  High School:Completed  College: some, one semester    Mental Status Exam  Appearance  : groomed, good eye contact, normal street clothes  Behavior  : pleasant and cooperative  Motor  : No abnormal  Speech  :normal rhythm, rate, volume, tone, not hyperverbal, not pressured, normal prosidy  Mood  : \"more energy\"  Affect  : euthymic, mood congruent, good " "variability  Thought Content  : negative suicidal ideations, negative homicidal ideations, negative obsessions  Perceptions  : negative auditory hallucinations, negative visual hallucinations  Thought Process  : linear  Insight/Judgement  : Fair/fair  Cognition  : grossly intact  Attention   : intact      Review of Systems:  Review of Systems   Constitutional:  Negative for diaphoresis and fatigue.   HENT:  Negative for drooling.    Eyes:  Negative for visual disturbance.   Respiratory:  Negative for cough, chest tightness and shortness of breath.    Cardiovascular:  Negative for chest pain, palpitations and leg swelling.   Gastrointestinal:  Negative for abdominal pain, diarrhea, nausea and vomiting.   Endocrine: Negative for cold intolerance and heat intolerance.   Genitourinary:  Negative for difficulty urinating.   Musculoskeletal:  Negative for joint swelling.   Allergic/Immunologic: Negative for immunocompromised state.   Neurological:  Negative for dizziness, seizures, speech difficulty, light-headedness, numbness and headaches.   Psychiatric/Behavioral:  Negative for behavioral problems, confusion and sleep disturbance.        Physical Exam:  Physical Exam    Vital Signs:   /62   Pulse 91   Ht 182.9 cm (72\")   Wt 106 kg (232 lb 12.8 oz)   BMI 31.57 kg/m²      Lab Results:   Lab on 05/13/2025   Component Date Value Ref Range Status    THC, Screen, Urine 05/13/2025 Positive (A)  Negative Final    Phencyclidine (PCP), Urine 05/13/2025 Negative  Negative Final    Cocaine Screen, Urine 05/13/2025 Negative  Negative Final    Methamphetamine, Ur 05/13/2025 Negative  Negative Final    Opiate Screen 05/13/2025 Negative  Negative Final    Amphetamine Screen, Urine 05/13/2025 Negative  Negative Final    Benzodiazepine Screen, Urine 05/13/2025 Negative  Negative Final    Tricyclic Antidepressants Screen 05/13/2025 Negative  Negative Final    Methadone Screen, Urine 05/13/2025 Negative  Negative Final    " Barbiturates Screen, Urine 05/13/2025 Negative  Negative Final    Oxycodone Screen, Urine 05/13/2025 Negative  Negative Final    Buprenorphine, Screen, Urine 05/13/2025 Negative  Negative Final    Fentanyl, Urine 05/13/2025 Negative  Negative Final       EKG Results:  No orders to display       Imaging Results:  No Images in the past 120 days found..      Assessment & Plan   Diagnoses and all orders for this visit:    1. ADHD (attention deficit hyperactivity disorder), inattentive type (Primary)    2. Generalized anxiety disorder    3. Major depressive disorder, recurrent episode, moderate  -     Brexpiprazole (Rexulti) 1 MG tablet; Take 1.5 tablets by mouth Daily.  Dispense: 45 tablet; Refill: 5    4. Insomnia due to mental condition  -     Brexpiprazole (Rexulti) 1 MG tablet; Take 1.5 tablets by mouth Daily.  Dispense: 45 tablet; Refill: 5    5. Panic attacks  -     Brexpiprazole (Rexulti) 1 MG tablet; Take 1.5 tablets by mouth Daily.  Dispense: 45 tablet; Refill: 5        Visit Diagnoses:    ICD-10-CM ICD-9-CM   1. ADHD (attention deficit hyperactivity disorder), inattentive type  F90.0 314.00   2. Generalized anxiety disorder  F41.1 300.02   3. Major depressive disorder, recurrent episode, moderate  F33.1 296.32   4. Insomnia due to mental condition  F51.05 300.9     327.02   5. Panic attacks  F41.0 300.01     07/01/2025: Vyvanse helping, no changes for now. Fairly stable, likes the therapy.    Acknowledged and normalized patient's thoughts, feelings, and concerns. Allowed patient to freely discuss and process issues, such as:  Anxiety and depression regarding family conflict/relationships.  ... using Rogerian psychotherapeutic techniques including unconditional positive regard, reflective listening, and demonstrating clear empathy, with the goal of ameliorating symptoms and maintaining, restoring, or improving self-esteem, adaptive skills, and ego or psychological functions (Fox et al. 1991), the long-term  "goal of which is to develop a better, healthier perspective and help the patient bear their circumstances more easily.  Time (minutes) spent providing supportive psychotherapy: 17  (This time is exclusive to the therapy session and separate from the time spent on activities used to meet the criteria for the E/M service (history, exam, medical decision-making).)  Start: 12:51  Stop: 01:08  Functional status: mild impairment  Treatment plan: Medication management and supportive psychotherapy  Prognosis: good  Progress: ADHD improving  6w    05/13/2025: UDS, CSA, start vyvanse for ADHD. Denies cardiac, seizure hx.    03/25/2025: Improved, stable, no changes. Discussed father.    02/11/2025: Nathan to discuss \"spirituality conflict,\" possible family conflict. Never tested for ADHD. Consider clonidine, lamictal.    12/30/2024: MDD, EVE, restart rexulti (re-sent in).    11/08/2024: Improving, increase rexulti for MDD, EVE.    09/30/2024: R/O ADHD, bipolar. Improving MDD, EVE on rexulti. Has been on adderall before, but he avoids it due to substance use hx. 5w    PLAN:  Safety: No acute safety concerns  Therapy: None, maybe later  Risk Assessment: Risk of self-harm acutely is moderate.  Risk factors include anxiety disorder, mood disorder, AODA hx, and recent psychosocial stressors (pandemic). Protective factors include no family history, denies access to guns/weapons, no present SI, no history of suicide attempts or self-harm in the past, healthcare seeking, future orientation, willingness to engage in care.  Risk of self-harm chronically is also moderate, but could be further elevated in the event of treatment noncompliance and/or AODA.  Meds:  CONTINUE vyvanse 30 mg qam. Risks, benefits, side effects discussed with patient including elevated heart rate, elevated blood pressure, irritability, insomnia, sexual dysfunction, appetite suppressing properties, psychosis, stroke, myocardial infarction, seizure.  After " discussion of these risks and benefits, the patient voiced understanding and agreed to proceed. Ottoniel reviewed, UDS ordered, and controlled substance agreement signed.  CONTINUE rexulti 1.5 mg qhs. Risks, benefits, alternatives discussed with patient including increased energy, exacerbation of irritability, weight gain, akathisia, GI upset, tardive dyskinesia/dystonia, movement issues, extrapyramidal symptoms, orthostatic hypotension.  Use care when operating vehicle, vessel, or machine. After discussion of these risks and benefits, the patient voiced understanding and agreed to proceed.  CONTINUE lexapro 20 mg qday. Risks, benefits, alternatives discussed with patient including GI upset, nausea vomiting diarrhea, hyponatremia, theoretical decrease of seizure threshold predisposing the patient to a slightly higher seizure risk, headaches, sexual dysfunction, serotonin syndrome, bleeding risk, increased suicidality in patients 24 years and younger, switching to tenzin/hypomania.  After discussion of these risks and benefits, the patient voiced understanding and agreed to proceed.  STOPPED hydroxyzine 10 mg BID. (Not needed 6/25) CONTINUE trazodone 100 mg qhs PRN. Risks, benefits, side effects discussed with patient including GI upset, sedation, dizziness/falls risk, grogginess the following day, prolongation of the QTc interval.  Do not use before operating vehicle, vessel, or machine. After discussion of these risks and benefits, the patient voiced understanding and agreed to proceed.    Labs: none    Patient screened positive for depression based on a PHQ-9 score of 9 on 5/5/2025. Follow-up recommendations include: Prescribed antidepressant medication treatment and Suicide Risk Assessment performed.           TREATMENT PLAN/GOALS: Continue supportive psychotherapy efforts and medications as indicated. Treatment and medication options discussed during today's visit. Patient acknowledged and verbally consented to  continue with current treatment plan and was educated on the importance of compliance with treatment and follow-up appointments.    MEDICATION ISSUES:  JASON reviewed as expected.  Discussed medication options and treatment plan of prescribed medication as well as the risks, benefits, and side effects including potential falls, possible impaired driving and metabolic adversities among others. Patient is agreeable to call the office with any worsening of symptoms or onset of side effects. Patient is agreeable to call 911 or go to the nearest ER should he/she begin having SI/HI. No medication side effects or related complaints today.     MEDS ORDERED DURING VISIT:  New Medications Ordered This Visit   Medications    Brexpiprazole (Rexulti) 1 MG tablet     Sig: Take 1.5 tablets by mouth Daily.     Dispense:  45 tablet     Refill:  5     Replaces 1 mg dose. Thank you for the help. Please call with questions: [phone].       Return in about 6 weeks (around 8/12/2025).         This document has been electronically signed by Hiram Fitch MD  July 1, 2025 13:09 EDT    Dictated Utilizing Dragon Dictation: Part of this note may be an electronic transcription/translation of spoken language to printed text using the Dragon Dictation System.

## 2025-07-09 ENCOUNTER — TELEMEDICINE (OUTPATIENT)
Dept: PSYCHIATRY | Facility: CLINIC | Age: 30
End: 2025-07-09
Payer: COMMERCIAL

## 2025-07-09 DIAGNOSIS — F33.1 MODERATE EPISODE OF RECURRENT MAJOR DEPRESSIVE DISORDER: ICD-10-CM

## 2025-07-09 DIAGNOSIS — F41.1 GENERALIZED ANXIETY DISORDER: Primary | ICD-10-CM

## 2025-07-09 PROCEDURE — 90837 PSYTX W PT 60 MINUTES: CPT | Performed by: COUNSELOR
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
## 2025-07-16 DIAGNOSIS — F90.0 ADHD (ATTENTION DEFICIT HYPERACTIVITY DISORDER), INATTENTIVE TYPE: ICD-10-CM

## 2025-07-16 RX ORDER — LISDEXAMFETAMINE DIMESYLATE 30 MG/1
30 CAPSULE ORAL EVERY MORNING
Qty: 30 CAPSULE | Refills: 0 | Status: SHIPPED | OUTPATIENT
Start: 2025-07-16

## 2025-07-16 NOTE — TELEPHONE ENCOUNTER
REFILL REQUEST:    lisdexamfetamine (Vyvanse) 30 MG capsule (06/13/2025)     F/UP: 08/12/2025.  LOV: 07/09/2025.    LAST UDS:  Urine Drug Screen - Urine, Clean Catch (05/13/2025 16:08)     LAST CSA:  CONTROLLED SUBSTANCE AGREEMENT - SCAN (05/13/2025)

## 2025-07-23 ENCOUNTER — TELEMEDICINE (OUTPATIENT)
Dept: PSYCHIATRY | Facility: CLINIC | Age: 30
End: 2025-07-23
Payer: COMMERCIAL

## 2025-07-23 DIAGNOSIS — F33.1 MODERATE EPISODE OF RECURRENT MAJOR DEPRESSIVE DISORDER: ICD-10-CM

## 2025-07-23 DIAGNOSIS — F41.1 GENERALIZED ANXIETY DISORDER: Primary | ICD-10-CM

## 2025-08-11 ENCOUNTER — TELEMEDICINE (OUTPATIENT)
Dept: PSYCHIATRY | Facility: CLINIC | Age: 30
End: 2025-08-11
Payer: COMMERCIAL

## 2025-08-11 DIAGNOSIS — F41.1 GENERALIZED ANXIETY DISORDER: Primary | ICD-10-CM

## 2025-08-11 DIAGNOSIS — F33.1 MODERATE EPISODE OF RECURRENT MAJOR DEPRESSIVE DISORDER: ICD-10-CM

## 2025-08-12 ENCOUNTER — OFFICE VISIT (OUTPATIENT)
Dept: PSYCHIATRY | Facility: CLINIC | Age: 30
End: 2025-08-12
Payer: COMMERCIAL

## 2025-08-12 VITALS
HEIGHT: 72 IN | DIASTOLIC BLOOD PRESSURE: 72 MMHG | HEART RATE: 95 BPM | OXYGEN SATURATION: 99 % | WEIGHT: 225 LBS | BODY MASS INDEX: 30.48 KG/M2 | SYSTOLIC BLOOD PRESSURE: 132 MMHG

## 2025-08-12 DIAGNOSIS — F41.1 GENERALIZED ANXIETY DISORDER: Primary | ICD-10-CM

## 2025-08-12 DIAGNOSIS — F33.1 MAJOR DEPRESSIVE DISORDER, RECURRENT EPISODE, MODERATE: ICD-10-CM

## 2025-08-12 DIAGNOSIS — Z72.0 TOBACCO USE: ICD-10-CM

## 2025-08-12 DIAGNOSIS — F51.05 INSOMNIA DUE TO MENTAL CONDITION: ICD-10-CM

## 2025-08-12 DIAGNOSIS — F90.0 ADHD (ATTENTION DEFICIT HYPERACTIVITY DISORDER), INATTENTIVE TYPE: ICD-10-CM

## 2025-08-12 DIAGNOSIS — F41.0 PANIC ATTACKS: ICD-10-CM

## 2025-08-12 RX ORDER — TRAZODONE HYDROCHLORIDE 100 MG/1
100 TABLET ORAL NIGHTLY
Qty: 90 TABLET | Refills: 1 | Status: SHIPPED | OUTPATIENT
Start: 2025-08-12

## 2025-08-12 RX ORDER — LISDEXAMFETAMINE DIMESYLATE 30 MG/1
30 CAPSULE ORAL EVERY MORNING
Qty: 30 CAPSULE | Refills: 0 | Status: SHIPPED | OUTPATIENT
Start: 2025-08-15